# Patient Record
Sex: MALE | Race: WHITE
[De-identification: names, ages, dates, MRNs, and addresses within clinical notes are randomized per-mention and may not be internally consistent; named-entity substitution may affect disease eponyms.]

---

## 2017-07-15 ENCOUNTER — HOSPITAL ENCOUNTER (INPATIENT)
Dept: HOSPITAL 26 - MED | Age: 72
LOS: 2 days | Discharge: NURSING, REHAB OR LONG-TERM CARE | DRG: 393 | End: 2017-07-17
Payer: COMMERCIAL

## 2017-07-15 VITALS — SYSTOLIC BLOOD PRESSURE: 127 MMHG | DIASTOLIC BLOOD PRESSURE: 74 MMHG

## 2017-07-15 VITALS — SYSTOLIC BLOOD PRESSURE: 110 MMHG | DIASTOLIC BLOOD PRESSURE: 70 MMHG

## 2017-07-15 VITALS — DIASTOLIC BLOOD PRESSURE: 86 MMHG | SYSTOLIC BLOOD PRESSURE: 134 MMHG

## 2017-07-15 VITALS — WEIGHT: 134 LBS | HEIGHT: 74 IN | BODY MASS INDEX: 17.2 KG/M2

## 2017-07-15 DIAGNOSIS — Z79.899: ICD-10-CM

## 2017-07-15 DIAGNOSIS — I10: ICD-10-CM

## 2017-07-15 DIAGNOSIS — E44.0: ICD-10-CM

## 2017-07-15 DIAGNOSIS — G30.9: ICD-10-CM

## 2017-07-15 DIAGNOSIS — J45.909: ICD-10-CM

## 2017-07-15 DIAGNOSIS — Z79.82: ICD-10-CM

## 2017-07-15 DIAGNOSIS — F02.80: ICD-10-CM
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
## 2017-07-15 DIAGNOSIS — E87.0: ICD-10-CM

## 2017-07-15 DIAGNOSIS — E87.5: ICD-10-CM

## 2017-07-15 DIAGNOSIS — I70.209: ICD-10-CM

## 2017-07-15 DIAGNOSIS — E11.65: ICD-10-CM

## 2017-07-15 DIAGNOSIS — G93.41: ICD-10-CM

## 2017-07-15 DIAGNOSIS — Z88.2: ICD-10-CM

## 2017-07-15 DIAGNOSIS — E78.2: ICD-10-CM

## 2017-07-15 DIAGNOSIS — I69.991: ICD-10-CM

## 2017-07-15 DIAGNOSIS — F20.9: ICD-10-CM

## 2017-07-15 DIAGNOSIS — K21.9: ICD-10-CM

## 2017-07-15 DIAGNOSIS — K94.23: Primary | ICD-10-CM
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
## 2017-07-15 DIAGNOSIS — E86.0: ICD-10-CM

## 2017-07-15 LAB
ALBUMIN FLD-MCNC: 3.2 G/DL (ref 3.4–5)
ALP SERPL-CCNC: 89 U/L (ref 46–116)
ALT SERPL-CCNC: 19 U/L (ref 16–63)
AMYLASE SERPL-CCNC: 76 U/L (ref 25–115)
ANION GAP SERPL CALCULATED.3IONS-SCNC: 14 MMOL/L (ref 8–16)
APTT PPP: 22.9 SECS (ref 22–35.6)
AST SERPL-CCNC: 18 U/L (ref 15–37)
BASOPHILS # BLD AUTO: 0.3 K/UL (ref 0–0.22)
BASOPHILS NFR BLD AUTO: 3.7 % (ref 0–2)
BILIRUB SERPL-MCNC: 0.3 MG/DL (ref 0–1)
BUN SERPL-MCNC: 16 MG/DL (ref 7–18)
CALCIUM SPEC-MCNC: 9.1 MG/DL (ref 8.5–10.1)
CHLORIDE SERPL-SCNC: 107 MMOL/L (ref 98–107)
CHOLEST SERPL-MCNC: 207 MG/DL (ref ?–200)
CHOLEST/HDLC SERPL: 4.8 {RATIO} (ref 1–4.5)
CO2 SERPL-SCNC: 30.8 MMOL/L (ref 21–32)
CREAT SERPL-MCNC: 0.8 MG/DL (ref 0.7–1.3)
EOSINOPHIL # BLD AUTO: 0.1 K/UL (ref 0–0.4)
EOSINOPHIL NFR BLD AUTO: 1.2 % (ref 0–4)
ERYTHROCYTE [DISTWIDTH] IN BLOOD BY AUTOMATED COUNT: 13.9 % (ref 11.6–13.7)
GFR SERPL CREATININE-BSD FRML MDRD: 123 ML/MIN (ref 90–?)
GLUCOSE SERPL-MCNC: 92 MG/DL (ref 74–106)
HCT VFR BLD AUTO: 46 % (ref 36–52)
HDLC SERPL-MCNC: 43 MG/DL (ref 40–60)
HGB BLD-MCNC: 14.9 G/DL (ref 12–18)
INR PPP: 1 (ref 0.8–1.2)
LDLC SERPL CALC-MCNC: 122 MG/DL (ref 60–100)
LIPASE SERPL-CCNC: 155 U/L (ref 73–393)
LYMPHOCYTES # BLD AUTO: 1.9 K/UL (ref 2–11.5)
LYMPHOCYTES NFR BLD AUTO: 28 % (ref 20.5–51.1)
MAGNESIUM SERPL-MCNC: 2.1 MG/DL (ref 1.8–2.4)
MCH RBC QN AUTO: 32 PG (ref 27–31)
MCHC RBC AUTO-ENTMCNC: 32 G/DL (ref 33–37)
MCV RBC AUTO: 100 FL (ref 80–94)
MONOCYTES # BLD AUTO: 0.4 K/UL (ref 0.8–1)
MONOCYTES NFR BLD AUTO: 6.1 % (ref 1.7–9.3)
NEUTROPHILS # BLD AUTO: 4.2 K/UL (ref 1.8–7.7)
NEUTROPHILS NFR BLD AUTO: 61 % (ref 42.2–75.2)
PHOSPHATE SERPL-MCNC: 3.7 MG/DL (ref 2.5–4.9)
PLATELET # BLD AUTO: 166 K/UL (ref 140–450)
POTASSIUM SERPL-SCNC: 5.8 MMOL/L (ref 3.5–5.1)
PROT SERPL-MCNC: 7.8 G/DL (ref 6.4–8.2)
PROTHROMBIN TIME: 9.9 SECS (ref 10.8–13.4)
RBC # BLD AUTO: 4.61 MIL/UL (ref 4.2–6.1)
SODIUM SERPL-SCNC: 146 MMOL/L (ref 136–145)
T4 FREE SERPL-MCNC: 0.98 NG/DL (ref 0.76–1.46)
TRIGL SERPL-MCNC: 211 MG/DL (ref 30–150)
TSH SERPL DL<=0.05 MIU/L-ACNC: 2.23 UIU/ML (ref 0.34–3.74)
WBC # BLD AUTO: 6.9 K/UL (ref 4.8–10.8)

## 2017-07-15 PROCEDURE — C9113 INJ PANTOPRAZOLE SODIUM, VIA: HCPCS

## 2017-07-15 PROCEDURE — 0D20XUZ CHANGE FEEDING DEVICE IN UPPER INTESTINAL TRACT, EXTERNAL APPROACH: ICD-10-PCS

## 2017-07-15 PROCEDURE — C1758 CATHETER, URETERAL: HCPCS

## 2017-07-15 RX ADMIN — SODIUM CHLORIDE SCH MLS/HR: 9 INJECTION, SOLUTION INTRAVENOUS at 13:55

## 2017-07-15 RX ADMIN — SODIUM CHLORIDE SCH MLS/HR: 9 INJECTION, SOLUTION INTRAVENOUS at 21:00

## 2017-07-15 RX ADMIN — SODIUM CHLORIDE SCH MLS/HR: 9 INJECTION, SOLUTION INTRAVENOUS at 17:47

## 2017-07-15 RX ADMIN — CARBIDOPA AND LEVODOPA SCH TAB: 25; 100 TABLET ORAL at 17:00

## 2017-07-15 RX ADMIN — SODIUM CHLORIDE SCH MLS/HR: 9 INJECTION, SOLUTION INTRAVENOUS at 23:55

## 2017-07-15 RX ADMIN — Medication SCH DEV: at 20:29

## 2017-07-16 VITALS — DIASTOLIC BLOOD PRESSURE: 81 MMHG | SYSTOLIC BLOOD PRESSURE: 146 MMHG

## 2017-07-16 VITALS — SYSTOLIC BLOOD PRESSURE: 138 MMHG | DIASTOLIC BLOOD PRESSURE: 75 MMHG

## 2017-07-16 VITALS — DIASTOLIC BLOOD PRESSURE: 80 MMHG | SYSTOLIC BLOOD PRESSURE: 136 MMHG

## 2017-07-16 VITALS — DIASTOLIC BLOOD PRESSURE: 70 MMHG | SYSTOLIC BLOOD PRESSURE: 125 MMHG

## 2017-07-16 VITALS — DIASTOLIC BLOOD PRESSURE: 58 MMHG | SYSTOLIC BLOOD PRESSURE: 126 MMHG

## 2017-07-16 VITALS — DIASTOLIC BLOOD PRESSURE: 73 MMHG | SYSTOLIC BLOOD PRESSURE: 113 MMHG

## 2017-07-16 LAB
ANION GAP SERPL CALCULATED.3IONS-SCNC: 11.8 MMOL/L (ref 8–16)
BUN SERPL-MCNC: 10 MG/DL (ref 7–18)
CALCIUM SPEC-MCNC: 8.5 MG/DL (ref 8.5–10.1)
CHLORIDE SERPL-SCNC: 114 MMOL/L (ref 98–107)
CO2 SERPL-SCNC: 27.6 MMOL/L (ref 21–32)
CREAT SERPL-MCNC: 0.7 MG/DL (ref 0.7–1.3)
GFR SERPL CREATININE-BSD FRML MDRD: 144 ML/MIN (ref 90–?)
GLUCOSE SERPL-MCNC: 89 MG/DL (ref 74–106)
POTASSIUM SERPL-SCNC: 5.4 MMOL/L (ref 3.5–5.1)
SODIUM SERPL-SCNC: 148 MMOL/L (ref 136–145)

## 2017-07-16 RX ADMIN — SODIUM CHLORIDE SCH MLS/HR: 9 INJECTION, SOLUTION INTRAVENOUS at 08:14

## 2017-07-16 RX ADMIN — SODIUM CHLORIDE SCH MLS/HR: 9 INJECTION, SOLUTION INTRAVENOUS at 20:57

## 2017-07-16 RX ADMIN — CARBIDOPA AND LEVODOPA SCH TAB: 25; 100 TABLET ORAL at 11:57

## 2017-07-16 RX ADMIN — PANTOPRAZOLE SODIUM SCH MG: 40 INJECTION, POWDER, FOR SOLUTION INTRAVENOUS at 08:13

## 2017-07-16 RX ADMIN — DOCUSATE SODIUM SCH MG: 50 LIQUID ORAL at 08:23

## 2017-07-16 RX ADMIN — Medication SCH DEV: at 11:57

## 2017-07-16 RX ADMIN — CARBIDOPA AND LEVODOPA SCH TAB: 25; 100 TABLET ORAL at 18:03

## 2017-07-16 RX ADMIN — Medication SCH DEV: at 17:11

## 2017-07-16 RX ADMIN — SODIUM CHLORIDE SCH MLS/HR: 9 INJECTION, SOLUTION INTRAVENOUS at 18:13

## 2017-07-16 RX ADMIN — Medication SCH DEV: at 08:22

## 2017-07-16 RX ADMIN — SODIUM CHLORIDE SCH MLS/HR: 9 INJECTION, SOLUTION INTRAVENOUS at 10:34

## 2017-07-16 RX ADMIN — Medication SCH DEV: at 20:34

## 2017-07-16 RX ADMIN — Medication SCH MG: at 08:25

## 2017-07-16 RX ADMIN — CARBIDOPA AND LEVODOPA SCH TAB: 25; 100 TABLET ORAL at 08:00

## 2017-07-17 VITALS — DIASTOLIC BLOOD PRESSURE: 69 MMHG | SYSTOLIC BLOOD PRESSURE: 107 MMHG

## 2017-07-17 VITALS — DIASTOLIC BLOOD PRESSURE: 70 MMHG | SYSTOLIC BLOOD PRESSURE: 125 MMHG

## 2017-07-17 VITALS — SYSTOLIC BLOOD PRESSURE: 119 MMHG | DIASTOLIC BLOOD PRESSURE: 89 MMHG

## 2017-07-17 VITALS — DIASTOLIC BLOOD PRESSURE: 85 MMHG | SYSTOLIC BLOOD PRESSURE: 138 MMHG

## 2017-07-17 LAB
ANION GAP SERPL CALCULATED.3IONS-SCNC: 11.7 MMOL/L (ref 8–16)
BUN SERPL-MCNC: 8 MG/DL (ref 7–18)
CALCIUM SPEC-MCNC: 7.7 MG/DL (ref 8.5–10.1)
CHLORIDE SERPL-SCNC: 108 MMOL/L (ref 98–107)
CO2 SERPL-SCNC: 26 MMOL/L (ref 21–32)
CREAT SERPL-MCNC: 0.6 MG/DL (ref 0.7–1.3)
GFR SERPL CREATININE-BSD FRML MDRD: 172 ML/MIN (ref 90–?)
GLUCOSE SERPL-MCNC: 105 MG/DL (ref 74–106)
HBA1C MFR BLD: 5.3 % (ref 4.8–5.6)
POTASSIUM SERPL-SCNC: 3.7 MMOL/L (ref 3.5–5.1)
SODIUM SERPL-SCNC: 142 MMOL/L (ref 136–145)
T3RU NFR SERPL: 23 % (ref 24–39)
T4 SERPL-MCNC: 8.9 UG/DL (ref 4.5–12)

## 2017-07-17 RX ADMIN — PANTOPRAZOLE SODIUM SCH MG: 40 INJECTION, POWDER, FOR SOLUTION INTRAVENOUS at 09:10

## 2017-07-17 RX ADMIN — DOCUSATE SODIUM SCH MG: 50 LIQUID ORAL at 09:09

## 2017-07-17 RX ADMIN — CARBIDOPA AND LEVODOPA SCH TAB: 25; 100 TABLET ORAL at 12:28

## 2017-07-17 RX ADMIN — SODIUM CHLORIDE SCH MLS/HR: 9 INJECTION, SOLUTION INTRAVENOUS at 04:59

## 2017-07-17 RX ADMIN — Medication SCH MG: at 09:09

## 2017-07-17 RX ADMIN — Medication SCH DEV: at 12:22

## 2017-07-17 RX ADMIN — CARBIDOPA AND LEVODOPA SCH TAB: 25; 100 TABLET ORAL at 09:09

## 2017-07-17 RX ADMIN — SODIUM CHLORIDE SCH MLS/HR: 9 INJECTION, SOLUTION INTRAVENOUS at 09:08

## 2017-07-17 RX ADMIN — Medication SCH DEV: at 06:09

## 2017-07-22 ENCOUNTER — HOSPITAL ENCOUNTER (EMERGENCY)
Dept: HOSPITAL 26 - MED | Age: 72
Discharge: NURSING, REHAB OR LONG-TERM CARE | End: 2017-07-22
Payer: COMMERCIAL

## 2017-07-22 VITALS — DIASTOLIC BLOOD PRESSURE: 88 MMHG | SYSTOLIC BLOOD PRESSURE: 130 MMHG

## 2017-07-22 VITALS — WEIGHT: 170 LBS | HEIGHT: 74 IN | BODY MASS INDEX: 21.82 KG/M2

## 2017-07-22 DIAGNOSIS — K21.9: ICD-10-CM

## 2017-07-22 DIAGNOSIS — G30.9: ICD-10-CM

## 2017-07-22 DIAGNOSIS — F02.80: ICD-10-CM

## 2017-07-22 DIAGNOSIS — Z86.73: ICD-10-CM

## 2017-07-22 DIAGNOSIS — I10: ICD-10-CM

## 2017-07-22 DIAGNOSIS — Z79.899: ICD-10-CM

## 2017-07-22 DIAGNOSIS — Z43.1: Primary | ICD-10-CM

## 2017-07-22 PROCEDURE — 43760: CPT

## 2017-07-22 PROCEDURE — 99284 EMERGENCY DEPT VISIT MOD MDM: CPT

## 2017-07-22 PROCEDURE — 74241: CPT

## 2017-07-22 NOTE — NUR
REPORT GIVEN TO HELEN SALAZAR AT [facility]. EMR HERE TO TRANSPORT 
PATIENT BACK TO NURSING FACILITY. X-RAY G TUBE PLACEMENT CONFIRMED PER MD LAIRD. NO S/S OF ACUTE DISTRESS.

## 2017-07-22 NOTE — NUR
71/M BIBA FROM [facility] AFTER G-TUBE REMOVAL 0500 TODAY. PATIENT 
HAS HX DEMENTIA ALZHEIMERS, PARKINSONS, DM, TIA AND IS A POOR HISTORIAN. DENIES 
PAIN. NO S/S OF ACUTE DISTRESS. DENIES N/V/D; SKIN IS PINK/WARM/DRY; AAOX4 WITH 
EVEN AND STEADY GAIT; LUNGS CLEAR BL; HR EVEN AND REGULAR; PT DENIES ANY FEVER, 
CP, SOB AT THIS TIME; PATIENT STATES PAIN OF 0/10 AT THIS TIME; VSS; PATIENT 
POSITIONED FOR COMFORT; HOB ELEVATED; BEDRAILS UP X2; BED DOWN. ER MD MADE 
AWARE OF PT STATUS.

## 2017-11-19 ENCOUNTER — HOSPITAL ENCOUNTER (INPATIENT)
Dept: HOSPITAL 36 - ER | Age: 72
LOS: 1 days | Discharge: NURSING, REHAB OR LONG-TERM CARE | DRG: 394 | End: 2017-11-20
Attending: INTERNAL MEDICINE | Admitting: INTERNAL MEDICINE
Payer: MEDICARE

## 2017-11-19 DIAGNOSIS — G30.9: ICD-10-CM

## 2017-11-19 DIAGNOSIS — Z88.2: ICD-10-CM

## 2017-11-19 DIAGNOSIS — K21.9: ICD-10-CM

## 2017-11-19 DIAGNOSIS — G20: ICD-10-CM

## 2017-11-19 DIAGNOSIS — E44.1: ICD-10-CM

## 2017-11-19 DIAGNOSIS — Z66: ICD-10-CM

## 2017-11-19 DIAGNOSIS — E78.5: ICD-10-CM

## 2017-11-19 DIAGNOSIS — M35.3: ICD-10-CM

## 2017-11-19 DIAGNOSIS — F02.80: ICD-10-CM

## 2017-11-19 DIAGNOSIS — E11.9: ICD-10-CM

## 2017-11-19 DIAGNOSIS — Z86.73: ICD-10-CM
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
## 2017-11-19 DIAGNOSIS — R56.9: ICD-10-CM

## 2017-11-19 DIAGNOSIS — M81.0: ICD-10-CM

## 2017-11-19 DIAGNOSIS — M19.90: ICD-10-CM

## 2017-11-19 DIAGNOSIS — F90.9: ICD-10-CM

## 2017-11-19 DIAGNOSIS — I25.10: ICD-10-CM
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
## 2017-11-19 DIAGNOSIS — K94.23: Primary | ICD-10-CM

## 2017-11-19 LAB
ALBUMIN/GLOB SERPL: 1.1 {RATIO} (ref 1–1.8)
ALP SERPL-CCNC: 81 U/L (ref 34–104)
ALT SERPL-CCNC: 11 U/L (ref 7–52)
ANION GAP SERPL CALC-SCNC: 10.4 MMOL/L (ref 7–16)
AST SERPL-CCNC: 15 U/L (ref 13–39)
BASOPHILS NFR BLD AUTO: 0.6 % (ref 0–2)
BILIRUB SERPL-MCNC: 0.6 MG/DL (ref 0.3–1)
BUN SERPL-MCNC: 10 MG/DL (ref 7–25)
BUN/CREAT SERPL: 16.7
CALCIUM SERPL-MCNC: 9.2 MG/DL (ref 8.6–10.3)
CHLORIDE SERPL-SCNC: 107 MEQ/L (ref 98–107)
CO2 SERPL-SCNC: 27.1 MEQ/L (ref 21–31)
CREAT SERPL-MCNC: 0.6 MG/DL (ref 0.7–1.3)
EOSINOPHIL NFR BLD AUTO: 1.5 % (ref 0–5)
ERYTHROCYTE [DISTWIDTH] IN BLOOD BY AUTOMATED COUNT: 13.5 % (ref 11.5–20)
GLOBULIN SER-MCNC: 3.5 GM/DL
GLUCOSE SERPL-MCNC: 90 MG/DL (ref 70–105)
HCT VFR BLD CALC: 43.3 % (ref 41–60)
HGB BLD-MCNC: 14.3 GM/DL (ref 12–16)
INR PPP: 0.97 (ref 0.5–1.4)
LYMPHOCYTES NFR BLD AUTO: 23.9 % (ref 20–50)
MCH RBC QN AUTO: 32 PG (ref 27–31)
MCHC RBC AUTO-ENTMCNC: 32.9 PG (ref 28–36)
MCV RBC AUTO: 97.3 FL (ref 80–99)
MONOCYTES NFR BLD AUTO: 5 % (ref 2–10)
NEUTROPHILS # BLD: 5.4 TH/CMM (ref 1.8–8)
NEUTROPHILS NFR BLD AUTO: 69 % (ref 40–80)
PLATELET # BLD: 205 TH/CMM (ref 150–400)
PMV BLD AUTO: 7.9 FL
POTASSIUM SERPL-SCNC: 4.5 MEQ/L (ref 3.5–5.1)
PROTHROMBIN TIME: 10.1 SECONDS (ref 9.5–11.5)
RBC # BLD AUTO: 4.45 MIL/CMM (ref 3.8–5.8)
SODIUM SERPL-SCNC: 140 MEQ/L (ref 136–145)
WBC # BLD AUTO: 7.7 TH/CMM (ref 4.8–10.8)

## 2017-11-19 PROCEDURE — Z7610: HCPCS

## 2017-11-19 RX ADMIN — INSULIN ASPART SCH: 100 INJECTION, SOLUTION INTRAVENOUS; SUBCUTANEOUS at 20:25

## 2017-11-19 RX ADMIN — INSULIN ASPART SCH: 100 INJECTION, SOLUTION INTRAVENOUS; SUBCUTANEOUS at 18:10

## 2017-11-19 RX ADMIN — DEXTROSE AND SODIUM CHLORIDE SCH MLS/HR: 5; .45 INJECTION, SOLUTION INTRAVENOUS at 21:22

## 2017-11-19 RX ADMIN — DOCUSATE SODIUM SCH: 50 LIQUID ORAL at 16:39

## 2017-11-19 NOTE — ED PHYSICIAN CHART
ED Chief Complaint/HPI





- Patient Information


Date Seen:: 11/19/17


Time Seen:: 08:00


Chief Complaint:: Pt is here for G-tube replacement.


History of Present Illness:: 





Brought in by ambulance from nursing facility because his G-tube was 

accidentally pulled out. A Douglass catheter was already put in place to keep 

gastrostomy stoma open. Pt appears to be comfortable without distress. Pt has h/

o dementia and is not cooperative; thus, H & P are limited. Info is primarily 

from review of limited transfer documents.


Allergies:: 


 Allergies











Allergy/AdvReac Type Severity Reaction Status Date / Time


 


Sulfa (Sulfonamide Allergy   Verified 11/19/17 08:03





Antibiotics)     











Vitals:: 





see Nurse Note.


Historian:: Patient


Family MD/PCP:: 





Dr. Go.


LMP:: 





N/A


Review:: Nurse's Note Reviewed, Transfer documents Reviewed





ED Review of Systems





- Review of Systems


General/Constitutional: Other (Pt does not cooperate for ROS.)





ED Past Medical History





- Past Medical History


Past Medical History: DM, CVA/TIA, Dyslipidemia, PUD/GERD, Dementia (with 

Alzheimer's Dz.), Other (Metaboic encephalopathy.)


Family History: Other (Pt does not cooperate to provide info on FHx.)


Social History: Care Facility, Other (Pt does not cooperate to provide info on 

SHx.)


Surgical History: PEG/GTube


Psychiatricy History: Dementia


Medication: Reviewed





Family Medical History





- Family Member


  ** Father


History Unknown: Yes


Ethnicity: Non-


Living Status: Unknown





ED Physical Exam





- Physical Examination


General/Constitutional: Awake, Well-developed, well-nourished, No distress, Non-

toxic appearing


Other Gen/Cons comments:: 





Breathes comfortably, responds to voice and tactile stimuli. Pt is 

uncooperative and at times agitated.


Head: Atraumatic


Eyes: Lids, conjuctiva normal, PERRL, EOMI


Skin: No rash, No lymphadenopathy


ENMT: External ears, nose nl, Nasal exam nl, Oropharynx nl


Neck: Nontender, Full ROM w/o pain, No JVD, No nuchal rigidity, No mass, No 

stridor


Respiratory: Nl effort/Exclusion, Clear to Auscultation, No Wheeze/Rhonchi/Rales


Cardio Vascular: RRR, No murmur, gallop, rubs


GI: No tenderness/rebounding/guarding, No organomegaly, Nondistended, No mass/

bruits


Other GI comments:: 





Abdomen is soft. Gastrostomy noticed with catheter in place in upper abdomen.


Extremities: No tenderness or effusion, No edema


Other Neuro/Psych comments:: 





Alert and responds to voice and tactile stimuli. Spontaneous movements noticed 

in all 4 extremities. Pt does not cooperate for full neurological exam.





ED Labs/Radiology/EKG Results





- Lab Results


Results: 





 Laboratory Tests











  11/19/17 11/19/17 11/19/17





  10:12 10:15 10:15


 


WBC   7.7 


 


RBC   4.45 


 


Hgb   14.3 


 


Hct   43.3 


 


MCV   97.3 


 


MCH   32.0 H 


 


MCHC Differential   32.9 


 


RDW   13.5 


 


Plt Count   205 


 


MPV   7.9 


 


Neutrophils %   69.0 


 


Lymphocytes %   23.9 


 


Monocytes %   5.0 


 


Eosinophils %   1.5 


 


Basophils %   0.6 


 


Sodium    140


 


Potassium    4.5


 


Chloride    107


 


Carbon Dioxide    27.1


 


Anion Gap    10.4


 


BUN    10


 


Creatinine    0.6 L


 


Est GFR ( Amer)    > 60.0


 


Est GFR (Non-Af Amer)    > 60.0


 


BUN/Creatinine Ratio    16.7


 


Glucose    90


 


POC Glucose  89  


 


Calcium    9.2


 


Total Bilirubin    0.6


 


AST    15


 


ALT    11


 


Alkaline Phosphatase    81


 


Total Protein    7.2


 


Albumin    3.7 L


 


Globulin    3.5


 


Albumin/Globulin Ratio    1.1














ED Septic Shock





- .


Is Septic Shock (SBP<90, OR Lactate>4 mmol\L) present?: No





ED Reassessment (Disposition)





- Reassessment


Reassessment:: 





1045 Pt has been repeatedly evaluated. Pt was agitated earlier. Pt is now calm. 

Remaining lab results just became available. Admitting physician is to be 

contacted.


1123 Case was discussed with Dr. Magaña with pertinent H & P and lab findings 

reviewed. Pt is to be admitted to [facility] for observation under his care.


Reassessment Condition:: Improved





- Diagnosis


Diagnosis:: 





G-tube dislodgement.


Diabetes mellitus. Stable.


Dementia with Alzheimer's disease by hx. Stable.





- Patient Disposition


Admitted to:: Med/Surg


Admitting Medical Physician:: Fito Magaña


Time:: 11:25


Condition at Disposition:: Stable, Improved

## 2017-11-20 PROCEDURE — 0D20XUZ CHANGE FEEDING DEVICE IN UPPER INTESTINAL TRACT, EXTERNAL APPROACH: ICD-10-PCS

## 2017-11-20 RX ADMIN — INSULIN ASPART SCH: 100 INJECTION, SOLUTION INTRAVENOUS; SUBCUTANEOUS at 22:17

## 2017-11-20 RX ADMIN — DOCUSATE SODIUM SCH: 50 LIQUID ORAL at 17:06

## 2017-11-20 RX ADMIN — INSULIN ASPART SCH: 100 INJECTION, SOLUTION INTRAVENOUS; SUBCUTANEOUS at 07:38

## 2017-11-20 RX ADMIN — DOCUSATE SODIUM SCH: 50 LIQUID ORAL at 09:28

## 2017-11-20 RX ADMIN — INSULIN ASPART SCH: 100 INJECTION, SOLUTION INTRAVENOUS; SUBCUTANEOUS at 11:29

## 2017-11-20 RX ADMIN — DEXTROSE AND SODIUM CHLORIDE SCH MLS/HR: 5; .45 INJECTION, SOLUTION INTRAVENOUS at 11:22

## 2017-11-20 RX ADMIN — INSULIN ASPART SCH: 100 INJECTION, SOLUTION INTRAVENOUS; SUBCUTANEOUS at 18:17

## 2017-11-20 NOTE — CONSULTATION
DATE OF CONSULTATION:  11/20/2017



INPATIENT GASTROINTESTINAL PROCEDURE



NAME OF PROCEDURE:  G-tube change.



REFERRING PHYSICIAN:  Dr. Magaña.



REASON FOR PROCEDURE:  Malfunctioning G-tube, dysphagia.



PREOPERATIVE DIAGNOSIS:  Malfunctioning G-tube, dysphagia.



POSTOPERATIVE DIAGNOSIS:  [id number] gastrostomy tube placed.



PROCEDURE:  The patient was placed on his back.  The old G-tube site was

identified with a Douglass tube.  The internal balloon was deflated and pulled out.

 At this point, a new 22-Khmer gastrostomy tube was lubricated at the tip and

inserted through the gastrocutaneous fistula entering into stomach lumen. 

Internal balloon was inflated with 15 mL of sterile saline.  The outer phalange

was secured in position.  Procedure was then completed.



COMPLICATIONS:  None.



FINDINGS:  New 22-Khmer gastrostomy tube placed.



RECOMMENDATION:

1.  KUB with Gastrografin confirmed placement.

2.  If it is in the stomach, may begin using it.

3.  Check residual every 6 hours and hold if greater than 100 mL.

4.  Abdominal binder to protect the G-tube.



Thank you for allowing me to participate.  Please call me if any questions.





DD: 11/20/2017 14:18

DT: 11/20/2017 21:00

JOB# [id number]  [id number]

## 2017-11-20 NOTE — CONSULTATION
DATE OF CONSULTATION:  11/20/2017



INPATIENT GASTROINTESTINAL CONSULTATION



REFERRING PHYSICIAN:  Dr. Magaña.



REASON FOR CONSULTATION:  Malfunctioning G-tube and dysphagia.



HISTORY:  This is a 72-year-old male who was brought to the hospital because the

G-tube was malfunctioning.  The patient apparently had pulled it out and then

replaced it with a Douglass tube.  The patient is otherwise a poor historian.



PAST MEDICAL HISTORY:  Parkinson's, dysphagia, polymyalgia rheumatica,

encephalopathy, diabetes, dementia, TIA, stroke, Alzheimer's, osteoporosis,

hyperlipidemia, GERD, psychosis, osteoarthritis.



PAST SURGICAL HISTORY:  PEG tube.



FAMILY HISTORY:  Noncontributory.



SOCIAL HISTORY:  Resident of skilled facility.



ALLERGIES:  SULFA.



CURRENT MEDICATIONS:  Tylenol, Maalox, aspirin, Sinemet, Colace, Robitussin,

heparin, insulin, milk of magnesia, Zofran, Protonix, Depakote.



REVIEW OF SYSTEMS:  Unobtainable.



PHYSICAL EXAMINATION:

VITAL SIGNS:  Temperature 97.1, breathing 18, pulse 87, blood pressure 122/69,

satting 98%.

GENERAL:  In no apparent distress.

EYES:  Anicteric.  Normal conjunctivae.

HEENT:  Normocephalic, atraumatic.  Moist mucous membranes.

NECK:  Soft, supple.

CHEST:  Clear.  No effort.

CARDIOVASCULAR:  Regular rate and rhythm.

ABDOMEN:  Soft, nontender, nondistended with G-tube.

SKIN:  Warm, dry.

EXTREMITIES:  Reveal no cyanosis.

PSYCHOLOGICAL:  Is awake.



LABORATORY DATA:  Show normal LFTs.  White count 7.7, hemoglobin 14.3, platelets

of 205.  INR 0.97.



IMPRESSION:  This is a 72-year-old male with underlying history of dysphagia,

has malfunctioning G-tube, will need to have a replacement.  G-tube will be

changed at bedside.  Continue supportive care.



Thank you for allowing me to participate.  Please call me if any questions.





DD: 11/20/2017 13:37

DT: 11/20/2017 20:10

JOB# [id number]  [id number]

## 2017-11-20 NOTE — DIAGNOSTIC IMAGING REPORT
Upper GI (Limited)



HISTORY: Gastrostomy tube placement



Water-soluble contrast was instilled through patient's gastrostomy tube.



The exam demonstrates opacification of the gastric lumen.



IMPRESSION:

1. Confirmation of gastrostomy tube within the gastric lumen

## 2017-11-20 NOTE — HISTORY & PHYSICAL
ADMIT DATE:  11/19/2017



DICTATED FOR:  Dr. Fito Magaña



CHIEF COMPLAINT:  G-tube malfunction.



HISTORY OF PRESENT ILLNESS:  This is a 72-year-old male who is a resident of

[facility], who was brought here to [facility]

due to G-tube malfunction.  According to nursing staff at [facility], the

patient apparently pulled out his G-tube accidentally, they replaced the

patient's G-tube with a Douglass catheter to have a stoma open.  Due to the

following reasons, the patient is now admitted to the med/surg unit.



PAST MEDICAL HISTORY:  Parkinson's, dysphagia, convulsions, polymyalgia

rheumatica, metabolic encephalopathy, type 2 diabetes, dementia, history of TIA

and cerebral infarction, Alzheimer's, osteoporosis, hyperlipidemia, GERD, AHD,

psychosis, and osteoarthritis.



PAST SURGICAL HISTORY:  PEG.



FAMILY HISTORY:  Noncontributory.



SOCIAL HISTORY:  The patient is a nursing home resident, requiring 24-hour

nursing care.



MEDICATIONS:  Aspirin, Depakene, docusate, multivitamins, pimavanserin tartrate,

Protonix, Sinemet.



REVIEW OF SYSTEMS:  Unable to obtain due to the patient's mental status.  The

patient is confused.



PHYSICAL EXAMINATION:

GENERAL:  This is an elderly male, awake, confused, in no apparent distress.

VITAL SIGNS:  Temperature 98.2, heart rate 78, blood pressure 150/67,

respirations 18, O2 98%.

HEENT:  Head; normocephalic, atraumatic.

NECK:  Supple.  No mass.

LUNGS:  Clear bilaterally.

HEART:  Regular rhythm.

ABDOMEN:  Soft, nontender.

SKIN:  Noted with few excoriations.



LABORATORY RESULTS:  WBC 7.7, H and H 14.3 and 43.3, platelet of 205.



Sodium 140, potassium 4.5, chloride 107, BUN 10, creatinine 0.6.



Albumin 3.7.



ASSESSMENT:

1.  G-tube malfunction.

2.  Agitation.

3.  Mild protein calorie malnutrition.

4.  Parkinson's.

5.  Dysphagia.

6.  Type 2 diabetes.

7.  Alzheimer's.

8.  Osteoporosis.

9.  Hyperlipidemia.

10.  GERD.

11.  Atherosclerotic heart disease.

12.  Convulsions.



PLAN:  The patient to be admitted to the med/surg unit.  We will get GI consult.

 We will keep the patient n.p.o. for now.  We will keep the patient hydrated

with IV fluids.  We will monitor the patient's electrolyte levels, get a

psychiatric on the case, 1:1 sitter for safety.  Seizure precautions will be

initiated.  We will continue to monitor this patient.





DD: 11/20/2017 09:32

DT: 11/20/2017 10:07

JOB# [id number]  [id number]

## 2017-11-20 NOTE — INTERNAL MEDICINE PROG NOTE
Internal Medicine Subjective





- Subjective


Service Date: 11/20/17 ([id number] [facility] )





Internal Medicine Objective





- Results


Result Diagrams: 


 11/19/17 10:15





 11/19/17 10:15


Recent Labs: 


 Laboratory Last Values











WBC  7.7 Th/cmm (4.8-10.8)   11/19/17  10:15    


 


RBC  4.45 Mil/cmm (3.80-5.80)   11/19/17  10:15    


 


Hgb  14.3 gm/dL (12-16)   11/19/17  10:15    


 


Hct  43.3 % (41.0-60)   11/19/17  10:15    


 


MCV  97.3 fl (80-99)   11/19/17  10:15    


 


MCH  32.0 pg (27.0-31.0)  H  11/19/17  10:15    


 


MCHC Differential  32.9 pg (28.0-36.0)   11/19/17  10:15    


 


RDW  13.5 % (11.5-20.0)   11/19/17  10:15    


 


Plt Count  205 Th/cmm (150-400)   11/19/17  10:15    


 


MPV  7.9 fl  11/19/17  10:15    


 


Neutrophils %  69.0 % (40.0-80.0)   11/19/17  10:15    


 


Lymphocytes %  23.9 % (20.0-50.0)   11/19/17  10:15    


 


Monocytes %  5.0 % (2.0-10.0)   11/19/17  10:15    


 


Eosinophils %  1.5 % (0.0-5.0)   11/19/17  10:15    


 


Basophils %  0.6 % (0.0-2.0)   11/19/17  10:15    


 


PT  10.1 SECONDS (9.5-11.5)   11/19/17  10:15    


 


INR  0.97  (0.5-1.4)   11/19/17  10:15    


 


PTT (Actin FS)  25.5 SECONDS (26.0-38.0)  L  11/19/17  10:15    


 


Sodium  140 mEq/L (136-145)   11/19/17  10:15    


 


Potassium  4.5 mEq/L (3.5-5.1)   11/19/17  10:15    


 


Chloride  107 mEq/L ()   11/19/17  10:15    


 


Carbon Dioxide  27.1 mEq/L (21.0-31.0)   11/19/17  10:15    


 


Anion Gap  10.4  (7.0-16.0)   11/19/17  10:15    


 


BUN  10 mg/dL (7-25)   11/19/17  10:15    


 


Creatinine  0.6 mg/dL (0.7-1.3)  L  11/19/17  10:15    


 


Est GFR ( Amer)  > 60.0 ml/min (>90)   11/19/17  10:15    


 


Est GFR (Non-Af Amer)  > 60.0 ml/min  11/19/17  10:15    


 


BUN/Creatinine Ratio  16.7   11/19/17  10:15    


 


Glucose  90 mg/dL ()   11/19/17  10:15    


 


POC Glucose  87 MG/DL (70 - 105)   11/20/17  06:49    


 


Calcium  9.2 mg/dL (8.6-10.3)   11/19/17  10:15    


 


Total Bilirubin  0.6 mg/dL (0.3-1.0)   11/19/17  10:15    


 


AST  15 U/L (13-39)   11/19/17  10:15    


 


ALT  11 U/L (7-52)   11/19/17  10:15    


 


Alkaline Phosphatase  81 U/L ()   11/19/17  10:15    


 


Total Protein  7.2 gm/dL (6.0-8.3)   11/19/17  10:15    


 


Albumin  3.7 gm/dL (4.2-5.5)  L  11/19/17  10:15    


 


Globulin  3.5 gm/dL  11/19/17  10:15    


 


Albumin/Globulin Ratio  1.1  (1.0-1.8)   11/19/17  10:15    














- Physical Exam


Vitals and I&O: 


 Vital Signs











Temp  98.2 F   11/20/17 04:00


 


Pulse  90   11/20/17 08:06


 


Resp  14   11/20/17 08:06


 


BP  115/67   11/20/17 04:00


 


Pulse Ox  100   11/20/17 08:06








 Intake & Output











 11/19/17 11/20/17 11/20/17





 18:59 06:59 18:59


 


Intake Total  0 0


 


Balance  0 0


 


Weight (lbs)  122 lb 122 lb


 


Intake:   


 


  Oral  0 0


 


Other:   


 


  # Voids  3 2


 


  # Bowel Movements  0 0











Active Medications: 


Current Medications





Acetaminophen (Tylenol 650mg/20.3ml Suspension)  650 mg GT Q4HR PRN


   PRN Reason: Pain (Mild)


   Stop: 01/18/18 12:21


Al Hydrox/Mg Hydrox/Simethicone (Maalox)  30 ml PO Q6H PRN


   PRN Reason: Dyspepsia


   Stop: 01/18/18 12:12


Albuterol Sulfate (Albuterol 2.5mg/3ml Neb Ud)  2.5 mg HHN Q2HRT PRN


   PRN Reason: Shortness of Breath or Wheeze


   Stop: 01/18/18 12:12


Aspirin (Aspirin Chewable)  81 mg GT DAILY [facility]


   Stop: 01/19/18 08:59


   Last Admin: 11/20/17 09:28 Dose:  Not Given


Carbidopa/Levodopa (Sinemet 25mg-100 Mg)  1 tab PO TID [facility]


   Stop: 01/18/18 13:59


   Last Admin: 11/20/17 09:28 Dose:  Not Given


Docusate Sodium (Colace)  100 mg GT BID [facility]


   Stop: 01/18/18 16:59


   Last Admin: 11/20/17 09:28 Dose:  Not Given


Guaifenesin (Robitussin)  200 mg PO Q4HR PRN


   PRN Reason: Cough or Congestion


   Stop: 01/18/18 12:12


Heparin Sodium (Porcine) (Heparin)  5,000 units SUBQ Q12HR [facility]


   Stop: 01/18/18 20:59


   Last Admin: 11/19/17 21:23 Dose:  5,000 units


Dextrose/Sodium Chloride (D5-0.45ns)  1,000 mls @ 80 mls/hr IV .[id number] [facility]


   Stop: 01/18/18 12:14


   Last Admin: 11/19/17 21:22 Dose:  80 mls/hr


Insulin Aspart (Novolog)  0 units SUBQ ACHS RUY


   PRN Reason: Protocol


   Stop: 01/18/18 16:29


   Last Admin: 11/20/17 07:38 Dose:  Not Given


Ipratropium Bromide (Atrovent Neb 0.5mg/2.5ml)  0.5 mg IH Q2HRT PRN


   PRN Reason: Shortness of Breath or Wheeze


   Stop: 01/18/18 12:12


Magnesium Hydroxide (Milk Of Magnesia)  30 ml GT HS PRN


   PRN Reason: Constipation


   Stop: 01/18/18 12:10


Miscellaneous (Pimavanserin Tartrate [Nuplazid])  34 mg GT DAILY [facility]


   Stop: 01/19/18 08:59


Miscellaneous (Probiotic Screen)  1 ea MC PRN PRN


   PRN Reason: PROTOCOL


   Stop: 01/18/18 14:39


Ondansetron HCl (Zofran)  4 mg IV Q8H PRN


   PRN Reason: Nausea / Vomiting


   Stop: 01/18/18 12:12


Pantoprazole Sodium (Protonix)  40 mg PO DAILY [facility]


   Stop: 01/19/18 08:59


   Last Admin: 11/20/17 09:28 Dose:  Not Given


Valproate Sodium (Depakene)  500 mg GT BID [facility]


   Stop: 01/18/18 16:59


   Last Admin: 11/20/17 09:28 Dose:  Not Given











- Procedures


Procedures: 


 Procedures











Procedure Code Date


 


GROUP PSYCHOTHERAPY 15328 02/16/16


 


GROUP PSYCHOTHERAPY GZHZZZZ 02/16/16


 


OTHER GROUP THERAPY 94.44 04/04/13


 


RECREATIONAL THERAPY 93.81 07/30/12














Internal Medicine Assmt/Plan





- Assessment


Assessment: 





GT MALFUNCTION


AGITATION


MILD PROTEIN CALORIE MALNUTRITION


DM-2


PARKINSON'S


DYSPHAGIA


DEMENTIA


HYPERLIPIDEMIA


GERD


OA


AHD


DNR STATUS

## 2017-11-27 NOTE — HISTORY & PHYSICAL
ADMIT DATE:  11/19/2017



CHIEF COMPLAINT:  Increasing agitation, has now pulled out the G-tube, unable to

take medication.



HISTORY OF PRESENT ILLNESS:  This is a 72-year-old  male with history

of seizure, stroke, diabetes, hypercholesterolemia, Parkinson's, Alzheimer's,

admitted from nursing facility secondary to agitation, apparently managed to

pull out the G-tube, unable to put in, in the nursing facility as well as in the

ER.  The patient admitted for further management.



PAST MEDICAL HISTORY:  As mentioned in history present of illness.



PAST SURGICAL HISTORY:  Status post G-tube.



ALLERGIES:  AS PER CHART SULFA MEDICATIONS.



MEDICATIONS:  The patient is on Tylenol, aspirin, Sinemet, Colace, magnesium,

multivitamins, pantoprazole, ____, Depakote.



FAMILY HISTORY:  Noncontributory.



SOCIAL HISTORY:  The patient is a nursing home patient, requiring 24-hour total

care.



REVIEW OF SYSTEMS:  This is limited secondary to the patient's current mental

state.  We will try to obtain more detailed review of systems at a later date by

talking to family members.  There was a relative [phone] has the power of

.  We will also try to get information from nursing staff at [facility]

#[phone] as well as from  ____ who normally follows the patient.



PHYSICAL EXAMINATION:

VITAL SIGNS:  Blood pressure 101/60, respiration 14, pulse ____, temperature

____.

GENERAL:  Elderly male, appears stated age, chronically ill.

NECK:  Supple.  No mass.

LUNGS:  Equal breath sounds, few rhonchi.

HEART:  Regular rate and rhythm.  Systolic ejection murmur.

ABDOMEN:  Soft, globular with G-tube, Douglass in place.

EXTREMITIES:  Positive excoriation, atrophic contractures.

NEUROLOGIC:  Limited.



LABORATORY DATA:  WBC 7.7, hemoglobin 14, platelets 205.  Sodium 140, potassium

4.5, BUN 10, creatinine 0.6, albumin 2.7.



ASSESSMENT AND PLAN:  Agitation, dehydration ____, inability to take medication,

malfunctioning G-tube, seizure, stroke, diabetes, Alzheimer's dementia,

hypercholesterolemia, Parkinson's, ____ mild protein-calorie malnutrition.  We

will continue the patient on IV hydration.  We will correct electrolyte

abnormalities.  Continue insulin sliding scale.  We will provide the patient

with anxiolytic and refer the patient to GI and Psychiatry.  We will follow the

patient closely.





DD: 11/19/2017 12:19

DT: 11/19/2017 20:19

JOB# [id number]  [id number]

## 2018-05-11 ENCOUNTER — HOSPITAL ENCOUNTER (INPATIENT)
Dept: HOSPITAL 36 - ER | Age: 73
LOS: 7 days | Discharge: NURSING, REHAB OR LONG-TERM CARE | DRG: 885 | End: 2018-05-18
Attending: PSYCHIATRY & NEUROLOGY | Admitting: PSYCHIATRY & NEUROLOGY
Payer: MEDICARE

## 2018-05-11 VITALS — SYSTOLIC BLOOD PRESSURE: 105 MMHG | DIASTOLIC BLOOD PRESSURE: 62 MMHG

## 2018-05-11 DIAGNOSIS — Z93.1: ICD-10-CM

## 2018-05-11 DIAGNOSIS — K21.9: ICD-10-CM

## 2018-05-11 DIAGNOSIS — Z86.73: ICD-10-CM

## 2018-05-11 DIAGNOSIS — G93.41: ICD-10-CM

## 2018-05-11 DIAGNOSIS — E78.5: ICD-10-CM

## 2018-05-11 DIAGNOSIS — M19.90: ICD-10-CM

## 2018-05-11 DIAGNOSIS — F29: Primary | ICD-10-CM

## 2018-05-11 DIAGNOSIS — R45.87: ICD-10-CM

## 2018-05-11 DIAGNOSIS — G20: ICD-10-CM

## 2018-05-11 DIAGNOSIS — Z88.2: ICD-10-CM

## 2018-05-11 DIAGNOSIS — Z87.11: ICD-10-CM

## 2018-05-11 DIAGNOSIS — E11.9: ICD-10-CM

## 2018-05-11 DIAGNOSIS — I25.10: ICD-10-CM

## 2018-05-11 DIAGNOSIS — F02.81: ICD-10-CM

## 2018-05-11 DIAGNOSIS — F60.0: ICD-10-CM

## 2018-05-11 LAB
ALBUMIN SERPL-MCNC: 3.9 GM/DL (ref 4.2–5.5)
ALBUMIN/GLOB SERPL: 1.1 {RATIO} (ref 1–1.8)
ALP SERPL-CCNC: 83 U/L (ref 34–104)
ALT SERPL-CCNC: 12 U/L (ref 7–52)
ANION GAP SERPL CALC-SCNC: 14.9 MMOL/L (ref 7–16)
AST SERPL-CCNC: 12 U/L (ref 13–39)
BASOPHILS # BLD AUTO: 0.1 TH/CUMM (ref 0–0.2)
BASOPHILS NFR BLD AUTO: 0.7 % (ref 0–2)
BILIRUB SERPL-MCNC: 0.2 MG/DL (ref 0.3–1)
BUN SERPL-MCNC: 12 MG/DL (ref 7–25)
CALCIUM SERPL-MCNC: 9.8 MG/DL (ref 8.6–10.3)
CHLORIDE SERPL-SCNC: 101 MEQ/L (ref 98–107)
CO2 SERPL-SCNC: 28.4 MEQ/L (ref 21–31)
CREAT SERPL-MCNC: 0.7 MG/DL (ref 0.7–1.3)
EOSINOPHIL # BLD AUTO: 0.2 TH/CMM (ref 0.1–0.4)
EOSINOPHIL NFR BLD AUTO: 2.3 % (ref 0–5)
ERYTHROCYTE [DISTWIDTH] IN BLOOD BY AUTOMATED COUNT: 13.2 % (ref 11.5–20)
GLOBULIN SER-MCNC: 3.7 GM/DL
GLUCOSE SERPL-MCNC: 134 MG/DL (ref 70–105)
HCT VFR BLD CALC: 44.9 % (ref 41–60)
HGB BLD-MCNC: 14.9 GM/DL (ref 12–16)
LYMPHOCYTE AB SER FC-ACNC: 2.3 TH/CMM (ref 1.5–3)
LYMPHOCYTES NFR BLD AUTO: 29.1 % (ref 20–50)
MAGNESIUM SERPL-MCNC: 2.3 MG/DL (ref 1.9–2.7)
MCH RBC QN AUTO: 32 PG (ref 27–31)
MCHC RBC AUTO-ENTMCNC: 33.3 PG (ref 28–36)
MCV RBC AUTO: 96.3 FL (ref 80–99)
MONOCYTES # BLD AUTO: 0.3 TH/CMM (ref 0.3–1)
MONOCYTES NFR BLD AUTO: 4.4 % (ref 2–10)
NEUTROPHILS # BLD: 5 TH/CMM (ref 1.8–8)
NEUTROPHILS NFR BLD AUTO: 63.5 % (ref 40–80)
PLATELET # BLD: 320 TH/CMM (ref 150–400)
PMV BLD AUTO: 7 FL
POTASSIUM SERPL-SCNC: 5.3 MEQ/L (ref 3.5–5.1)
RBC # BLD AUTO: 4.66 MIL/CMM (ref 3.8–5.8)
SODIUM SERPL-SCNC: 139 MEQ/L (ref 136–145)
WBC # BLD AUTO: 7.9 TH/CMM (ref 4.8–10.8)

## 2018-05-11 PROCEDURE — Z7610: HCPCS

## 2018-05-11 NOTE — ED PHYSICIAN CHART
ED Chief Complaint/HPI





- Patient Information


Date Seen:: 05/11/18


Time Seen:: 13:06


Chief Complaint:: Increased agitation


History of Present Illness:: 


73 yo male was brought from SNF to ER for evaluation of increased agitation and 

aggressiveness. Patient was extremely combative in the ER.


Allergies:: 


 Allergies











Allergy/AdvReac Type Severity Reaction Status Date / Time


 


Sulfa (Sulfonamide Allergy   Verified 11/19/17 08:03





Antibiotics)     











Vitals:: 


 Vital Signs - 8 hr











  05/11/18





  12:47


 


Temp 97.9 F


 


HR 70


 


RR 18


 


/70


 


O2 Sat % 100














ED Review of Systems





- Review of Systems


General/Constitutional: No fever


Skin: No bruising


Head: No headache


Eyes: No pain


ENT: No nasal drainage


Neck: No neck pain


Cardio Vascular: No chest pain


Pulmonary: No SOB


GI: No nausea, No vomiting


Musculoskeletal: No bone or joint pain


Psychiatric: Prior psych history, Depression, Other (Agitation)


Neurological: No syncope





ED Past Medical History





- Past Medical History


Past Medical History: DM, CAD, CVA/TIA, Dyslipidemia, PUD/GERD, Arthritis, 

Dementia, Other (Parkinson's disease, metabolic encephalopathy)


Social History: Non Smoker, No Alcohol, No Drug Use


Surgical History: PEG/GTube


Psychiatricy History: Other (Psychosis)





Family Medical History





- Family Member


  ** Father


History Unknown: Yes


Ethnicity: Unknown


Living Status: Unknown


Hx Family Cancer: No


Hx Family Coronary Artery Disease: No


Hx Family Congestive Heart Failure: No


Hx Family Hypertension: No


Hx Family Stroke: No


Hx Family Diabetes: No


Hx Family Seizures: No


Hx Family Dementia: No


Hx Family AIDS: No


Hx Family HIV: No


Hx Family COPD: No


Hx Family Hepatitis: No


Hx Family Psychiatric Problems: No


Hx Family Tuberculosis: No





ED Physical Exam





- Physical Examination


General/Constitutional: Awake, Alert


Head: Atraumatic


Eyes: PERRL


Skin: No ecchymosis


ENMT: Nasal exam nl


Neck: No nuchal rigidity


Respiratory: No Wheeze/Rhonchi/Rales


Cardio Vascular: RRR, No murmur, gallop, rubs, NL S1 S2


Other GI comments:: 


G-tube stoma closed and dry


Extremities: normal strength in all extremities


Neuro/Psych: No focal deficits





ED Labs/Radiology/EKG Results





- Lab Results


Results: 





 Laboratory Last Values











WBC  7.9 Th/cmm (4.8-10.8)   05/11/18  15:31    


 


RBC  4.66 Mil/cmm (3.80-5.80)   05/11/18  15:31    


 


Hgb  14.9 gm/dL (12-16)   05/11/18  15:31    


 


Hct  44.9 % (41.0-60)   05/11/18  15:31    


 


MCV  96.3 fl (80-99)   05/11/18  15:31    


 


MCH  32.0 pg (27.0-31.0)  H  05/11/18  15:31    


 


MCHC Differential  33.3 pg (28.0-36.0)   05/11/18  15:31    


 


RDW  13.2 % (11.5-20.0)   05/11/18  15:31    


 


Plt Count  320 Th/cmm (150-400)   05/11/18  15:31    


 


MPV  7.0 fl  05/11/18  15:31    


 


Neutrophils %  63.5 % (40.0-80.0)   05/11/18  15:31    


 


Lymphocytes %  29.1 % (20.0-50.0)   05/11/18  15:31    


 


Monocytes %  4.4 % (2.0-10.0)   05/11/18  15:31    


 


Eosinophils %  2.3 % (0.0-5.0)   05/11/18  15:31    


 


Basophils %  0.7 % (0.0-2.0)   05/11/18  15:31    


 


Sodium  139 mEq/L (136-145)   05/11/18  15:31    


 


Potassium  5.3 mEq/L (3.5-5.1)  H  05/11/18  15:31    


 


Chloride  101 mEq/L ()   05/11/18  15:31    


 


Carbon Dioxide  28.4 mEq/L (21.0-31.0)   05/11/18  15:31    


 


Anion Gap  14.9  (7.0-16.0)   05/11/18  15:31    


 


BUN  12 mg/dL (7-25)   05/11/18  15:31    


 


Creatinine  0.7 mg/dL (0.7-1.3)   05/11/18  15:31    


 


Est GFR ( Amer)  > 60.0 ml/min (>90)   05/11/18  15:31    


 


Est GFR (Non-Af Amer)  > 60.0 ml/min  05/11/18  15:31    


 


BUN/Creatinine Ratio  17.1   05/11/18  15:31    


 


Glucose  134 mg/dL ()  H  05/11/18  15:31    


 


Calcium  9.8 mg/dL (8.6-10.3)   05/11/18  15:31    


 


Magnesium  2.3 mg/dL (1.9-2.7)   05/11/18  15:31    


 


Total Bilirubin  0.2 mg/dL (0.3-1.0)  L  05/11/18  15:31    


 


AST  12 U/L (13-39)  L  05/11/18  15:31    


 


ALT  12 U/L (7-52)   05/11/18  15:31    


 


Alkaline Phosphatase  83 U/L ()   05/11/18  15:31    


 


Troponin I  < 0.01 ng/mL (0.01-0.05)  L  05/11/18  15:31    


 


B-Natriuretic Peptide  43.8 pg/mL (5.0-100.0)   05/11/18  15:31    


 


Total Protein  7.6 gm/dL (6.0-8.3)   05/11/18  15:31    


 


Albumin  3.9 gm/dL (4.2-5.5)  L  05/11/18  15:31    


 


Globulin  3.7 gm/dL  05/11/18  15:31    


 


Albumin/Globulin Ratio  1.1  (1.0-1.8)   05/11/18  15:31    


 


TSH  1.11 uIU/ml (0.34-5.60)   05/11/18  15:31    














- Radiology Results


Results: 


CXR: no focal consolidation





ED Assessment





- Assessment


General Assessment: 


DM II


Parkinson's disease


Metabolic encephalopathy


Psychosis


Assessment/Comments:: 


CBC, CMP, Trop I, UA


EKG (patient refused)


CXR


Haldol IM


Benadryl IM


Ativan IM


Admit to geropsych unit





ED Septic Shock





- .


Is Septic Shock (SBP<90, OR Lactate>4 mmol\L) present?: No





- <6hrs of presentation:


Vital Signs: 


 Vital Signs - 8 hr











  05/11/18





  12:47


 


Temp 97.9 F


 


HR 70


 


RR 18


 


/70


 


O2 Sat % 100














ED Reassessment (Disposition)





- Reassessment


Reassessment Condition:: Improved





- Patient Disposition


Discharge/Transfer:: [facility] w/in this hosp


Admitting Medical Physician:: Dulce Bridges


Admitting Psych Physician:: Lisa Coe





ED Discharge Plan





- Patient Disposition


Admit/Discharge/Transfer: Other Care w/in this hosp


Condition at Disposition: Stable
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
## 2018-05-12 NOTE — PSYCHOSOCIAL EVALUATION
DATE OF SERVICE:  05/12/2018



IDENTIFYING DATA:  The patient is a 72-year-old  male, resident of the

[facility].  Information obtained by directly

interviewing the patient as well as reviewing the admission papers and they are

reliable.



JUSTIFICATION FOR HOSPITALIZATION:  The patient is admitted here on a voluntary

basis in view of his agitation and disruptive behavior.



CHIEF COMPLAINT:  "I do not know why I have to be here."



HISTORY OF PRESENT ILLNESS:  This is one of multiple psychiatric

hospitalizations for this patient who is known to me from the previous

psychiatric hospitalizations and treatments.  The patient on the day of the

hospitalization is reported to have been very agitated and has been getting

easily upset.  The patient has been also reported to be sexually preoccupied and

wants a female staff to be there in his room.  The patient has been diagnosed to

have psychotic disorder, not otherwise specified and has been followed up by Dr. Nowak on an outpatient basis.  The patient at the time of the evaluation has been

getting easily irritable, angry and patient has been testing the limits.  The

patient is reported to be very disorganized and the patient could not be

contained at a lower level of care and hence the patient has been referred over

here for stabilization.  Sleep and appetite prior to the hospitalization are

reported to be poor.



PAST PSYCHIATRIC HISTORY:  Please refer to the above.



MEDICAL HISTORY:  Physical examination is requested to be done by Dr. Bridges

and is noted to be significant for Parkinson's disease and degenerative joint

disease.



PHYSICAL OR SEXUAL ABUSE HISTORY:  None.



LEGAL PROBLEMS:  None at this time.



STRENGTH AND ASSETS:  The patient is motivated.



MENTAL STATUS EXAMINATION:  The patient is a 72-year-old, looking his stated

age, thin built, superficially cooperative.  Eye contact is poor.  Mood is noted

to be irritable.  Affect is constricted.  Insight and judgment at this time are

noted to be impaired.  Impulse control is noted to be poor.  The patient is

sexually preoccupied.  The patient has been insisting on a female to be in his

room and the patient has not been displaying any judgment.  The patient is also

noted to be paranoid.  The patient could not be contained at a lower level of

care.  The patient, however, is noted to be alert and aware that he is in the

hospital.  Attention span and concentration noted to be poor.



DIAGNOSTIC IMPRESSION:

AXIS I:  Psychotic disorder, not otherwise specified.

AXIS II:  None.

AXIS III:  Parkinson's disease.



IMMEDIATE TREATMENT PLAN:  The patient is going to be continued on Depakote and

low dose of Seroquel is going to be added to ____ irritability and mood swings

and the patient is going to be followed up with the supportive therapy.



ESTIMATED LENGTH OF STAY:  5-7 days.



DISCHARGE CRITERIA:  When he no longer threat to self or others and be able to

cope up with the stress.





DD: 05/12/2018 09:37

DT: 05/12/2018 13:04

[facility]# [id number]  [id number]

## 2018-05-13 NOTE — GENERAL PROGRESS NOTE
Subjective





- Review of Systems


Service Date: 05/13/18


Subjective: 





resting comfortably in gerichair


no distress





Objective





- Results


Result Diagrams: 


 05/11/18 15:31





 05/11/18 15:31


Recent Labs: 


 Laboratory Last Values











WBC  7.9 Th/cmm (4.8-10.8)   05/11/18  15:31    


 


RBC  4.66 Mil/cmm (3.80-5.80)   05/11/18  15:31    


 


Hgb  14.9 gm/dL (12-16)   05/11/18  15:31    


 


Hct  44.9 % (41.0-60)   05/11/18  15:31    


 


MCV  96.3 fl (80-99)   05/11/18  15:31    


 


MCH  32.0 pg (27.0-31.0)  H  05/11/18  15:31    


 


MCHC Differential  33.3 pg (28.0-36.0)   05/11/18  15:31    


 


RDW  13.2 % (11.5-20.0)   05/11/18  15:31    


 


Plt Count  320 Th/cmm (150-400)   05/11/18  15:31    


 


MPV  7.0 fl  05/11/18  15:31    


 


Neutrophils %  63.5 % (40.0-80.0)   05/11/18  15:31    


 


Lymphocytes %  29.1 % (20.0-50.0)   05/11/18  15:31    


 


Monocytes %  4.4 % (2.0-10.0)   05/11/18  15:31    


 


Eosinophils %  2.3 % (0.0-5.0)   05/11/18  15:31    


 


Basophils %  0.7 % (0.0-2.0)   05/11/18  15:31    


 


Sodium  139 mEq/L (136-145)   05/11/18  15:31    


 


Potassium  5.3 mEq/L (3.5-5.1)  H  05/11/18  15:31    


 


Chloride  101 mEq/L ()   05/11/18  15:31    


 


Carbon Dioxide  28.4 mEq/L (21.0-31.0)   05/11/18  15:31    


 


Anion Gap  14.9  (7.0-16.0)   05/11/18  15:31    


 


BUN  12 mg/dL (7-25)   05/11/18  15:31    


 


Creatinine  0.7 mg/dL (0.7-1.3)   05/11/18  15:31    


 


Est GFR ( Amer)  > 60.0 ml/min (>90)   05/11/18  15:31    


 


Est GFR (Non-Af Amer)  > 60.0 ml/min  05/11/18  15:31    


 


BUN/Creatinine Ratio  17.1   05/11/18  15:31    


 


Glucose  134 mg/dL ()  H  05/11/18  15:31    


 


Calcium  9.8 mg/dL (8.6-10.3)   05/11/18  15:31    


 


Magnesium  2.3 mg/dL (1.9-2.7)   05/11/18  15:31    


 


Total Bilirubin  0.2 mg/dL (0.3-1.0)  L  05/11/18  15:31    


 


AST  12 U/L (13-39)  L  05/11/18  15:31    


 


ALT  12 U/L (7-52)   05/11/18  15:31    


 


Alkaline Phosphatase  83 U/L ()   05/11/18  15:31    


 


Troponin I  < 0.01 ng/mL (0.01-0.05)  L  05/11/18  15:31    


 


B-Natriuretic Peptide  43.8 pg/mL (5.0-100.0)   05/11/18  15:31    


 


Total Protein  7.6 gm/dL (6.0-8.3)   05/11/18  15:31    


 


Albumin  3.9 gm/dL (4.2-5.5)  L  05/11/18  15:31    


 


Globulin  3.7 gm/dL  05/11/18  15:31    


 


Albumin/Globulin Ratio  1.1  (1.0-1.8)   05/11/18  15:31    


 


TSH  1.11 uIU/ml (0.34-5.60)   05/11/18  15:31    


 


Valproic Acid  36.2 ug/mL (50.0-100.0)  L  05/12/18  10:52    














- Physical Exam


Vitals and I&O: 


 Vital Signs











Temp  99.2 F   05/13/18 15:46


 


Pulse  76   05/13/18 15:46


 


Resp  20   05/13/18 15:46


 


BP  114/67   05/13/18 15:46


 


Pulse Ox  96   05/13/18 15:46











Active Medications: 


Current Medications





Acetaminophen (Tylenol)  650 mg PO Q4HR PRN


   PRN Reason: Mild Pain / Temp above 100


   Stop: 07/10/18 19:50


   Last Admin: 05/13/18 09:57 Dose:  650 mg


Carbidopa/Levodopa (Sinemet 25mg-100 Mg)  2 tab PO TID [facility]Y


   Stop: 07/10/18 20:59


   Last Admin: 05/13/18 16:59 Dose:  Not Given


Docusate Sodium (Colace)  100 mg PO BID [facility]


   Stop: 07/11/18 08:59


   Last Admin: 05/13/18 16:59 Dose:  Not Given


Lorazepam (Ativan)  0.5 mg PO Q4HR PRN; Protocol


   PRN Reason: Anxiety


   Stop: 06/10/18 21:55


   Last Admin: 05/13/18 09:57 Dose:  0.5 mg


Magnesium Hydroxide (Milk Of Magnesia)  30 ml PO HS PRN


   PRN Reason: Constipation


   Stop: 07/10/18 19:50


Multivitamins/Vitamin C (Theragran)  1 tab PO DAILY RUY


   Stop: 07/11/18 08:59


   Last Admin: 05/13/18 09:32 Dose:  1 tab


Quetiapine Fumarate (Seroquel)  25 mg PO HS RUY


   PRN Reason: Protocol


   Stop: 07/12/18 13:32


Valproate Sodium (Depakene)  500 mg PO BID [facility]


   Stop: 07/11/18 08:59


   Last Admin: 05/13/18 16:59 Dose:  500 mg


Zolpidem Tartrate (Ambien)  5 mg PO HS PRN


   PRN Reason: Insomnia


   Stop: 07/10/18 19:50








General: No acute distress


HEENT: PERRLA, EOMI


Neck: Supple, JVD, Thyromegaly


Cardiovascular: Regular rate, Normal S1, Normal S2


Lungs: Clear to auscultation


Abdomen: Bowel sounds, Soft





- Procedures


Procedures: 


 Procedures











Procedure Code Date


 


CHANGE FEEDING DEVICE IN UP INTEST TRACT, EXTERN APPROACH [id number] 11/19/17


 


GROUP PSYCHOTHERAPY 18607 02/16/16


 


GROUP PSYCHOTHERAPY [id number] 02/16/16


 


OTHER GROUP THERAPY 94.44 04/04/13


 


RECREATIONAL THERAPY 93.81 07/30/12














Assessment/Plan





- Assessment


Assessment: 





parkinson's dz


dementia


DJD





- Plan


Plan: 





cont current treatment

## 2018-05-13 NOTE — PROGRESS NOTES
DATE:  05/13/2018



PSYCHIATRIC PROGRESS NOTE



SUBJECTIVE:  Staff was spoken to.  The patient is interviewed.  Mood is noted to

be irritable.  Affect is constricted, even if the patient was fair.  The patient

has constantly been saying that he is hungry.  The patient's coping skills are

noted to be very poor.  The patient is currently on valproic acid 500 mg twice a

day and has been able to tolerate the medication, but has been getting easily

irritable and angry.  The patient has been placed on 12.5 mg of the Seroquel,

which is going to be gradually increased to 25 mg and patient is going to be

closely monitored and provided with supportive psychotherapy.  The patient is

not ready to be discharged to a lower level of care yet.





DD: 05/13/2018 13:33

DT: 05/13/2018 18:09

[facility]# [id number]  [id number]

## 2018-05-14 NOTE — CONSULTATION
DATE OF CONSULTATION:     05/14/2018



REFERRING PHYSICIAN:  Lisa Coe M.D.



TYPE OF CONSULTATION:  Psychology.



HISTORY OF PRESENT ILLNESS:  The patient is a 72-year-old  male.  The

patient is a resident of [facility].  The patient is known to this

writer from a previous hospitalization.  The following is my review of the 
medical

record and by the patient's self report.  The patient is admitted due to

increased agitation and disruptive behavior.  According to record review, the

patient was reported by the staff at his facility to be sexually preoccupied

with female staff.  The patient had been making gestures as well as comments to

the female staff.  Upon interview, the patient denied these behaviors and stated

that he did not know why he had to be here.  The patient presents as irritable

and agitated.  The patient denied any suicidal ideation, plan or intention.



PAST MEDICAL HISTORY:  Please see history and physical by Dr. Bridges.  The

patient has a history of Parkinson's disease and degenerative joint disease.



PAST PSYCHIATRIC HISTORY:  Not available at the time of this clinical

interview.



SUBSTANCE ABUSE HISTORY:  The patient denied any history.



PSYCHOSOCIAL HISTORY:  The patient did not answer questions about occupational

or educational history or Gnosticist affiliation.  The patient denied any history

of physical or sexual abuse or any current legal problems.



MENTAL STATUS EXAMINATION:  The patient appears to be his stated age.  The

patient's attitude is superficially cooperative.  Eye contact is poor.  Speech

is pressured and hyperverbal.  The patient denied any auditory or visual

hallucinations.  The patient denies any suicidal ideation, plan or intention. 

Thought process shows to be somewhat confused and disorganized.  The patient's

behavior on the unit has been testing the limits of the staff.  Impulse control

is inadequate.  Concentration is poor.  There seems to be some paranoid

ideation.  This needs further evaluation.  Sensorium is alert and oriented to

person and place.  The patient did not participate in the memory assessment. 

The patient did not participate in the interpretation of proverbs.  Insight is

poor.  Judgment is poor.



DIAGNOSTIC IMPRESSION:

AXIS I:  Psychotic disorder, not otherwise specified.

AXIS II:  Deferred.

AXIS III, Parkinson's disease.



PLAN:  The patient has been seen by Dr. Coe for psychiatric evaluation

and for management of the patient's psychotropic medications.  According to

record, the patient is continued on Depakote and low dose of Seroquel will be

added.  The patient continues to display an irritable mood.  We will provide

anger management as well as de-escalation.  We will provide limit setting and

motivational enhancement for the patient to become compliant and stay compliant

with all aspects of his care and treatment plan.  We will provide coping

strategies for phase of life issues.  We will provide reality testing, reality

orientation, and reality integration.  We will continue to provide the patient

an opportunity to verbalize his concerns versus acting out.  The patient will be

able to demonstrate emotional and self-regulation including no sexual gestures 
or remarks

prior to his discharge.



Thank you, Dr. Coe for this consult and the opportunity to participate

with you in this patient's care.





DD: 05/14/2018 15:47

DT: 05/14/2018 20:57

JOB# [id number]  [id number]

ANAIS

## 2018-05-14 NOTE — INTERNAL MEDICINE PROG NOTE
Internal Medicine Subjective





- Subjective


Service Date: 05/14/18


Patient is:: awake, verbal, in bed, confused


Per staff patient has:: no adverse event





Internal Medicine Objective





- Results


Result Diagrams: 


 05/11/18 15:31





 05/11/18 15:31


Recent Labs: 


 Laboratory Last Values











WBC  7.9 Th/cmm (4.8-10.8)   05/11/18  15:31    


 


RBC  4.66 Mil/cmm (3.80-5.80)   05/11/18  15:31    


 


Hgb  14.9 gm/dL (12-16)   05/11/18  15:31    


 


Hct  44.9 % (41.0-60)   05/11/18  15:31    


 


MCV  96.3 fl (80-99)   05/11/18  15:31    


 


MCH  32.0 pg (27.0-31.0)  H  05/11/18  15:31    


 


MCHC Differential  33.3 pg (28.0-36.0)   05/11/18  15:31    


 


RDW  13.2 % (11.5-20.0)   05/11/18  15:31    


 


Plt Count  320 Th/cmm (150-400)   05/11/18  15:31    


 


MPV  7.0 fl  05/11/18  15:31    


 


Neutrophils %  63.5 % (40.0-80.0)   05/11/18  15:31    


 


Lymphocytes %  29.1 % (20.0-50.0)   05/11/18  15:31    


 


Monocytes %  4.4 % (2.0-10.0)   05/11/18  15:31    


 


Eosinophils %  2.3 % (0.0-5.0)   05/11/18  15:31    


 


Basophils %  0.7 % (0.0-2.0)   05/11/18  15:31    


 


Sodium  139 mEq/L (136-145)   05/11/18  15:31    


 


Potassium  5.3 mEq/L (3.5-5.1)  H  05/11/18  15:31    


 


Chloride  101 mEq/L ()   05/11/18  15:31    


 


Carbon Dioxide  28.4 mEq/L (21.0-31.0)   05/11/18  15:31    


 


Anion Gap  14.9  (7.0-16.0)   05/11/18  15:31    


 


BUN  12 mg/dL (7-25)   05/11/18  15:31    


 


Creatinine  0.7 mg/dL (0.7-1.3)   05/11/18  15:31    


 


Est GFR ( Amer)  > 60.0 ml/min (>90)   05/11/18  15:31    


 


Est GFR (Non-Af Amer)  > 60.0 ml/min  05/11/18  15:31    


 


BUN/Creatinine Ratio  17.1   05/11/18  15:31    


 


Glucose  134 mg/dL ()  H  05/11/18  15:31    


 


Calcium  9.8 mg/dL (8.6-10.3)   05/11/18  15:31    


 


Magnesium  2.3 mg/dL (1.9-2.7)   05/11/18  15:31    


 


Total Bilirubin  0.2 mg/dL (0.3-1.0)  L  05/11/18  15:31    


 


AST  12 U/L (13-39)  L  05/11/18  15:31    


 


ALT  12 U/L (7-52)   05/11/18  15:31    


 


Alkaline Phosphatase  83 U/L ()   05/11/18  15:31    


 


Troponin I  < 0.01 ng/mL (0.01-0.05)  L  05/11/18  15:31    


 


B-Natriuretic Peptide  43.8 pg/mL (5.0-100.0)   05/11/18  15:31    


 


Total Protein  7.6 gm/dL (6.0-8.3)   05/11/18  15:31    


 


Albumin  3.9 gm/dL (4.2-5.5)  L  05/11/18  15:31    


 


Globulin  3.7 gm/dL  05/11/18  15:31    


 


Albumin/Globulin Ratio  1.1  (1.0-1.8)   05/11/18  15:31    


 


TSH  1.11 uIU/ml (0.34-5.60)   05/11/18  15:31    


 


Valproic Acid  36.2 ug/mL (50.0-100.0)  L  05/12/18  10:52    














- Physical Exam


Vitals and I&O: 


 Vital Signs











Temp  97.8 F   05/14/18 20:00


 


Pulse  73   05/14/18 20:00


 


Resp  18   05/14/18 20:00


 


BP  117/75   05/14/18 20:00


 


Pulse Ox  97   05/14/18 20:00








 Intake & Output











 05/14/18 05/14/18 05/15/18





 06:59 18:59 06:59


 


Intake Total   360


 


Balance   360


 


Intake:   


 


  Oral   360


 


Other:   


 


  # Voids   3











Active Medications: 


Current Medications





Acetaminophen (Tylenol)  650 mg PO Q4HR PRN


   PRN Reason: Mild Pain / Temp above 100


   Stop: 07/10/18 19:50


   Last Admin: 05/13/18 09:57 Dose:  650 mg


Carbidopa/Levodopa (Sinemet 25mg-100 Mg)  2 tab PO TID [facility]


   Stop: 07/10/18 20:59


   Last Admin: 05/14/18 20:33 Dose:  2 tab


Docusate Sodium (Colace)  100 mg PO BID [facility]


   Stop: 07/11/18 08:59


   Last Admin: 05/14/18 16:31 Dose:  100 mg


Lorazepam (Ativan)  0.5 mg PO Q4HR PRN; Protocol


   PRN Reason: Anxiety


   Stop: 06/10/18 21:55


   Last Admin: 05/13/18 09:57 Dose:  0.5 mg


Magnesium Hydroxide (Milk Of Magnesia)  30 ml PO HS PRN


   PRN Reason: Constipation


   Stop: 07/10/18 19:50


Multivitamins/Vitamin C (Theragran)  1 tab PO DAILY [facility]


   Stop: 07/11/18 08:59


   Last Admin: 05/14/18 09:49 Dose:  1 tab


Quetiapine Fumarate (Seroquel)  25 mg PO HS [facility]


   PRN Reason: Protocol


   Stop: 07/12/18 13:32


   Last Admin: 05/14/18 20:33 Dose:  25 mg


Valproate Sodium (Depakene)  500 mg PO BID [facility]


   Stop: 07/11/18 08:59


   Last Admin: 05/14/18 16:31 Dose:  500 mg


Zolpidem Tartrate (Ambien)  5 mg PO HS PRN


   PRN Reason: Insomnia


   Stop: 07/10/18 19:50








General: demented


HEENT: anicteric sclerae, throat clear


Neck: Supple, No JVD, No thyromegaly, +2 carotid pulse wo bruit, No LAD


Cardiovascular: RRR, Normal S1, Normal S2, without murmur


Abdomen: soft, non-tender, non-distended


Neurological: no change





- Procedures


Procedures: 


 Procedures











Procedure Code Date


 


CHANGE FEEDING DEVICE IN UP INTEST TRACT, EXTERN APPROACH [id number] 11/19/17


 


GROUP PSYCHOTHERAPY 55498 02/16/16


 


GROUP PSYCHOTHERAPY [id number] 02/16/16


 


OTHER GROUP THERAPY 94.44 04/04/13


 


RECREATIONAL THERAPY 93.81 07/30/12














Internal Medicine Assmt/Plan





- Assessment


Assessment: 





1.parkinsons disease.


2.djd.


3.dementia.


4.psychosis.





- Plan


Plan: 





continue on current medication and diet.

## 2018-05-15 NOTE — INTERNAL MEDICINE PROG NOTE
Internal Medicine Subjective





- Subjective


Service Date: 05/15/18


Patient seen and examined:: without staff


Patient is:: awake, verbal, in bed, confused


Per staff patient has:: no adverse event





Internal Medicine Objective





- Results


Result Diagrams: 


 05/11/18 15:31





 05/11/18 15:31


Recent Labs: 


 Laboratory Last Values











WBC  7.9 Th/cmm (4.8-10.8)   05/11/18  15:31    


 


RBC  4.66 Mil/cmm (3.80-5.80)   05/11/18  15:31    


 


Hgb  14.9 gm/dL (12-16)   05/11/18  15:31    


 


Hct  44.9 % (41.0-60)   05/11/18  15:31    


 


MCV  96.3 fl (80-99)   05/11/18  15:31    


 


MCH  32.0 pg (27.0-31.0)  H  05/11/18  15:31    


 


MCHC Differential  33.3 pg (28.0-36.0)   05/11/18  15:31    


 


RDW  13.2 % (11.5-20.0)   05/11/18  15:31    


 


Plt Count  320 Th/cmm (150-400)   05/11/18  15:31    


 


MPV  7.0 fl  05/11/18  15:31    


 


Neutrophils %  63.5 % (40.0-80.0)   05/11/18  15:31    


 


Lymphocytes %  29.1 % (20.0-50.0)   05/11/18  15:31    


 


Monocytes %  4.4 % (2.0-10.0)   05/11/18  15:31    


 


Eosinophils %  2.3 % (0.0-5.0)   05/11/18  15:31    


 


Basophils %  0.7 % (0.0-2.0)   05/11/18  15:31    


 


Sodium  139 mEq/L (136-145)   05/11/18  15:31    


 


Potassium  5.3 mEq/L (3.5-5.1)  H  05/11/18  15:31    


 


Chloride  101 mEq/L ()   05/11/18  15:31    


 


Carbon Dioxide  28.4 mEq/L (21.0-31.0)   05/11/18  15:31    


 


Anion Gap  14.9  (7.0-16.0)   05/11/18  15:31    


 


BUN  12 mg/dL (7-25)   05/11/18  15:31    


 


Creatinine  0.7 mg/dL (0.7-1.3)   05/11/18  15:31    


 


Est GFR ( Amer)  > 60.0 ml/min (>90)   05/11/18  15:31    


 


Est GFR (Non-Af Amer)  > 60.0 ml/min  05/11/18  15:31    


 


BUN/Creatinine Ratio  17.1   05/11/18  15:31    


 


Glucose  134 mg/dL ()  H  05/11/18  15:31    


 


Calcium  9.8 mg/dL (8.6-10.3)   05/11/18  15:31    


 


Magnesium  2.3 mg/dL (1.9-2.7)   05/11/18  15:31    


 


Total Bilirubin  0.2 mg/dL (0.3-1.0)  L  05/11/18  15:31    


 


AST  12 U/L (13-39)  L  05/11/18  15:31    


 


ALT  12 U/L (7-52)   05/11/18  15:31    


 


Alkaline Phosphatase  83 U/L ()   05/11/18  15:31    


 


Troponin I  < 0.01 ng/mL (0.01-0.05)  L  05/11/18  15:31    


 


B-Natriuretic Peptide  43.8 pg/mL (5.0-100.0)   05/11/18  15:31    


 


Total Protein  7.6 gm/dL (6.0-8.3)   05/11/18  15:31    


 


Albumin  3.9 gm/dL (4.2-5.5)  L  05/11/18  15:31    


 


Globulin  3.7 gm/dL  05/11/18  15:31    


 


Albumin/Globulin Ratio  1.1  (1.0-1.8)   05/11/18  15:31    


 


TSH  1.11 uIU/ml (0.34-5.60)   05/11/18  15:31    


 


Valproic Acid  36.2 ug/mL (50.0-100.0)  L  05/12/18  10:52    


 


RPR  NONREACTIVE  (NONREACTIVE)   05/11/18  15:31    














- Physical Exam


Vitals and I&O: 


 Vital Signs











Temp  98.1 F   05/15/18 06:12


 


Pulse  69   05/15/18 06:12


 


Resp  18   05/15/18 06:12


 


BP  110/72   05/15/18 06:12


 


Pulse Ox  98   05/15/18 06:12








 Intake & Output











 05/14/18 05/15/18 05/15/18





 18:59 06:59 18:59


 


Intake Total  360 


 


Balance  360 


 


Intake:   


 


  Oral  360 


 


Other:   


 


  # Voids  3 











Active Medications: 


Current Medications





Acetaminophen (Tylenol)  650 mg PO Q4HR PRN


   PRN Reason: Mild Pain / Temp above 100


   Stop: 07/10/18 19:50


   Last Admin: 05/13/18 09:57 Dose:  650 mg


Carbidopa/Levodopa (Sinemet 25mg-100 Mg)  2 tab PO TID [facility]


   Stop: 07/10/18 20:59


   Last Admin: 05/15/18 09:57 Dose:  2 tab


Docusate Sodium (Colace)  100 mg PO BID [facility]


   Stop: 07/11/18 08:59


   Last Admin: 05/15/18 09:57 Dose:  100 mg


Lorazepam (Ativan)  0.5 mg PO Q4HR PRN; Protocol


   PRN Reason: Anxiety


   Stop: 06/10/18 21:55


   Last Admin: 05/13/18 09:57 Dose:  0.5 mg


Magnesium Hydroxide (Milk Of Magnesia)  30 ml PO HS PRN


   PRN Reason: Constipation


   Stop: 07/10/18 19:50


Multivitamins/Vitamin C (Theragran)  1 tab PO DAILY [facility]


   Stop: 07/11/18 08:59


   Last Admin: 05/15/18 09:57 Dose:  1 tab


Quetiapine Fumarate (Seroquel)  25 mg PO HS [facility]


   PRN Reason: Protocol


   Stop: 07/12/18 13:32


   Last Admin: 05/14/18 20:33 Dose:  25 mg


Valproate Sodium (Depakene)  500 mg PO BID [facility]


   Stop: 07/11/18 08:59


   Last Admin: 05/15/18 09:56 Dose:  500 mg


Zolpidem Tartrate (Ambien)  5 mg PO HS PRN


   PRN Reason: Insomnia


   Stop: 07/10/18 19:50








General: demented


HEENT: anicteric sclerae, throat clear


Neck: Supple, No JVD, No thyromegaly, +2 carotid pulse wo bruit, No LAD


Cardiovascular: RRR, Normal S1, Normal S2, without murmur


Abdomen: soft, non-tender, non-distended


Neurological: no change





- Procedures


Procedures: 


 Procedures











Procedure Code Date


 


CHANGE FEEDING DEVICE IN UP INTEST TRACT, EXTERN APPROACH [id number] 11/19/17


 


GROUP PSYCHOTHERAPY [id number] 02/16/16


 


GROUP PSYCHOTHERAPY [id number] 02/16/16


 


OTHER GROUP THERAPY 94.44 04/04/13


 


RECREATIONAL THERAPY 93.81 07/30/12














Internal Medicine Assmt/Plan





- Assessment


Assessment: 





1.parkinsons disease.


2.djd.


3.dementia.


4.psychosis.





- Plan


Plan: 





continue on current medication and diet.

## 2018-05-15 NOTE — PROGRESS NOTES
DATE:  05/14/2018



SUBJECTIVE:  Staff was spoken to.  The patient is interviewed.  Mood is noted to

be irritable.  Affect is constricted.  Insight and judgment at this time are

noted to be still impaired.  Impulse control is noted to be limited.  The

patient needs to be redirected.  Coping skills are noted to be very poor.  The

patient has been able to tolerate the medications at this time and patient

constantly asks for food.  The patient is currently on carbidopa/levodopa and is

also on 25 mg of the Seroquel and 500 mg twice a day of the Depakote.



ASSESSMENT:  The patient is still impulsive and paranoid.



PLAN:  To continue the patient with the supportive therapy and followup.





DD: 05/14/2018 19:08

DT: 05/15/2018 10:43

[facility]# [id number]  [id number]

## 2018-05-15 NOTE — PROGRESS NOTES
DATE:  05/15/2018



SUBJECTIVE:  Staff was spoken to.  The patient is interviewed.  Mood is noted to

be irritable.  Affect is constricted.  The patient is getting easily irritable

and angry.  Coping skills are noted to be very poor.  Sleep and appetite also

noted very poor.  The patient has been having difficult time to cope with the

stress.  No side effects to the medications are noted at this time.  The patient

is currently on valproic acid 500 mg twice a day and Seroquel 25 mg at bedtime. 

The patient has been able to tolerate the medication.



ASSESSMENT:  The patient is still paranoid and impulsive.



PLAN:  To continue the patient with the supportive therapy, encouraged the

patient to verbalize the concerns rather than to act out.





DD: 05/15/2018 08:40

DT: 05/15/2018 14:30

[facility]# [id number]  [id number]

## 2018-05-16 NOTE — INTERNAL MEDICINE PROG NOTE
Internal Medicine Subjective





- Subjective


Service Date: 18


Patient seen and examined:: without staff


Patient is:: awake, verbal, in bed, confused


Per staff patient has:: no adverse event





Internal Medicine Objective





- Results


Result Diagrams: 


 18 15:31





 18 15:31


Recent Labs: 


 Laboratory Last Values











WBC  7.9 Th/cmm (4.8-10.8)   18  15:31    


 


RBC  4.66 Mil/cmm (3.80-5.80)   18  15:31    


 


Hgb  14.9 gm/dL (12-16)   18  15:31    


 


Hct  44.9 % (41.0-60)   18  15:31    


 


MCV  96.3 fl (80-99)   18  15:31    


 


MCH  32.0 pg (27.0-31.0)  H  18  15:31    


 


MCHC Differential  33.3 pg (28.0-36.0)   18  15:31    


 


RDW  13.2 % (11.5-20.0)   18  15:31    


 


Plt Count  320 Th/cmm (150-400)   18  15:31    


 


MPV  7.0 fl  18  15:31    


 


Neutrophils %  63.5 % (40.0-80.0)   18  15:31    


 


Lymphocytes %  29.1 % (20.0-50.0)   18  15:    


 


Monocytes %  4.4 % (2.0-10.0)   18  15:31    


 


Eosinophils %  2.3 % (0.0-5.0)   18  15:31    


 


Basophils %  0.7 % (0.0-2.0)   18  15:31    


 


Sodium  139 mEq/L (136-145)   18  15:31    


 


Potassium  5.3 mEq/L (3.5-5.1)  H  18  15:31    


 


Chloride  101 mEq/L ()   18  15:31    


 


Carbon Dioxide  28.4 mEq/L (21.0-31.0)   18  15:31    


 


Anion Gap  14.9  (7.0-16.0)   18  15:31    


 


BUN  12 mg/dL (7-25)   18  15:31    


 


Creatinine  0.7 mg/dL (0.7-1.3)   18  15:31    


 


Est GFR ( Amer)  > 60.0 ml/min (>90)   18  15:31    


 


Est GFR (Non-Af Amer)  > 60.0 ml/min  18  15:31    


 


BUN/Creatinine Ratio  17.1   18  15:31    


 


Glucose  134 mg/dL ()  H  18  15:31    


 


Calcium  9.8 mg/dL (8.6-10.3)   18  15:31    


 


Magnesium  2.3 mg/dL (1.9-2.7)   18  15:31    


 


Total Bilirubin  0.2 mg/dL (0.3-1.0)  L  18  15:31    


 


AST  12 U/L (13-39)  L  18  15:31    


 


ALT  12 U/L (7-52)   18  15:31    


 


Alkaline Phosphatase  83 U/L ()   18  15:31    


 


Troponin I  < 0.01 ng/mL (0.01-0.05)  L  18  15:31    


 


B-Natriuretic Peptide  43.8 pg/mL (5.0-100.0)   18  15:31    


 


Total Protein  7.6 gm/dL (6.0-8.3)   18  15:31    


 


Albumin  3.9 gm/dL (4.2-5.5)  L  18  15:31    


 


Globulin  3.7 gm/dL  18  15:31    


 


Albumin/Globulin Ratio  1.1  (1.0-1.8)   18  15:31    


 


TSH  1.11 uIU/ml (0.34-5.60)   18  15:31    


 


Valproic Acid  36.2 ug/mL (50.0-100.0)  L  18  10:52    


 


RPR  NONREACTIVE  (NONREACTIVE)   18  15:31    














- Physical Exam


Vitals and I&O: 


 Vital Signs











Temp  98.3 F   18 15:52


 


Pulse  87   18 15:52


 


Resp  20   18 15:52


 


BP  93/52   18 15:52


 


Pulse Ox  96   18 15:52








 Intake & Output











 18





 06:59 18:59 06:59


 


Intake Total 480  


 


Balance 480  


 


Intake:   


 


  Oral 480  


 


Other:   


 


  # Voids 2  











Active Medications: 


Current Medications





Acetaminophen (Tylenol)  650 mg PO Q4HR PRN


   PRN Reason: Mild Pain / Temp above 100


   Stop: 07/10/18 19:50


   Last Admin: 18 09:57 Dose:  650 mg


Carbidopa/Levodopa (Sinemet 25mg-100 Mg)  2 tab PO TID [facility]


   Stop: 07/10/18 20:59


   Last Admin: 18 20:50 Dose:  2 tab


Docusate Sodium (Colace)  100 mg PO BID [facility]


   Stop: 18 08:59


   Last Admin: 18 16:25 Dose:  100 mg


Lorazepam (Ativan)  0.5 mg PO Q4HR PRN; Protocol


   PRN Reason: Anxiety


   Stop: 06/10/18 21:55


   Last Admin: 05/15/18 21:12 Dose:  0.5 mg


Magnesium Hydroxide (Milk Of Magnesia)  30 ml PO HS PRN


   PRN Reason: Constipation


   Stop: 07/10/18 19:50


Multivitamins/Vitamin C (Theragran)  1 tab PO DAILY [facility]


   Stop: 18 08:59


   Last Admin: 18 08:06 Dose:  1 tab


Quetiapine Fumarate (Seroquel)  25 mg PO HS [facility]


   PRN Reason: Protocol


   Stop: 18 13:32


   Last Admin: 18 20:50 Dose:  25 mg


Valproate Sodium (Depakene)  500 mg PO BID [facility]


   Stop: 18 08:59


   Last Admin: 18 16:25 Dose:  500 mg


Zolpidem Tartrate (Ambien)  5 mg PO HS PRN


   PRN Reason: Insomnia


   Stop: 07/10/18 19:50


   Last Admin: 18 20:50 Dose:  5 mg








General: demented


HEENT: anicteric sclerae, throat clear


Neck: Supple, No JVD, No thyromegaly, +2 carotid pulse wo bruit, No LAD


Cardiovascular: RRR, Normal S1, Normal S2, without murmur


Abdomen: soft, non-tender, non-distended


Neurological: no change





- Procedures


Procedures: 


 Procedures











Procedure Code Date


 


CHANGE FEEDING DEVICE IN UP INTEST TRACT, EXTERN APPROACH [id number]


 


GROUP PSYCHOTHERAPY [id number] 16


 


GROUP PSYCHOTHERAPY [id number] 16


 


OTHER GROUP THERAPY 94.44 13


 


RECREATIONAL THERAPY 93.81 12














Internal Medicine Assmt/Plan





- Assessment


Assessment: 





1.parkinsons disease.


2.djd.


3.dementia.


4.psychosis.





- Plan


Plan: 





continue on current medication and diet.





Nutritional Asmnt/Malnutr-PDOC





- Dietary Evaluation


Malnutrition Findings (Please click <Entered> for more info): 








Nutritional Asmnt/Malnutrition                             Start:  18 15:

02


Text:                                                      Status: Complete    

  


Freq:                                                                          

  


 Document     18 15:04  LYLY  (Rec: 18 15:10  LYLY  [id number])


 Nutritional Asmnt/Malnutrition


     Patient General Information


      Nutritional Screening                      Moderate Risk


      Diagnosis                                  psychosis NOS


      Pertinent Medical Hx/Surgical Hx           dementia, psychosis, parkinson


                                                 's disease, DJD


      Subjective Information                     pt seen sitting in jeremias-chair


                                                 in hallway, very confused, not


                                                 able to communicate. Per


                                                 nurse, pt eats well.


      Current Diet Order/ Nutrition Support      pureed


      Pertinent Medications                      colace, theragran, serqoquel


      Pertinent Labs                              K 5.2, glucose 134


     Nutritional Hx/Data


      Height                                     1.75 m


      Height (Calculated Centimeters)            175.3


      Current Weight (lbs)                       60.328 kg


      Weight (Calculated Kilograms)              60.3


      Weight (Calculated Grams)                  62365.8


      Ideal Body Weight                          160


      % Ideal Body Weight                        83


      Body Mass Index (BMI)                      19.6


      Weight Status                              Approriate


     GI Symptoms


      GI Symptoms                                None


      Last BM                                    5/15


      Difficult in:                              None


      Skin Integrity/Comment:                    ald gtube stoma closed, skin


                                                 intact


      Current %PO                                Good (%)


     Estimated Nutritional Goals


      BEE in Kcals:                              Using Current wt


      Calories/Kcals/Kg                          25-30


      Kcals Calculated                           5746-2170


      Protein:                                   Using Current wt


      Protein g/k-1.1


      Protein Calculated                         60-66


      Fluid: ml                                  1500-1800ml (1ml/kcal)


     Nutritional Problem


      No current Nutrition Prob


       Problem                                   N/A


     Malnutrition Alert


      Is there a minimum of two criteria         No


       selected?                                 


       Query Text:Check all the applicable       


       criteria. A minimum of two criteria are   


       recommended for diagnosis of either       


       severe or non-severe malnutrition.        


     Malnutrition Related to Morbid Obesity


      Malnutrition related to morbid obesity     No


     Intervention/Recommendation


      Comments                                   1. Continue with pureed diet


                                                 as ordered. Monitor blood


                                                 sugar.


                                                 2. Monitor PO intake, wt, labs


                                                 and skin integrity


                                                 3. F/U as low risk in 7 days,


                                                 


     Expected Outcomes/Goals


      Expected Outcomes/Goals                    1. PO intake continue to meet


                                                 at least 75% of nutritional


                                                 needs.


                                                 2. Wt stability, skin to


                                                 remain intact, labs to


                                                 approach WNL.

## 2018-05-17 NOTE — INTERNAL MEDICINE PROG NOTE
Internal Medicine Subjective





- Subjective


Service Date: 18


Patient seen and examined:: without staff


Patient is:: awake, verbal, in bed, confused


Per staff patient has:: no adverse event





Internal Medicine Objective





- Results


Result Diagrams: 


 18 15:31





 18 15:31


Recent Labs: 


 Laboratory Last Values











WBC  7.9 Th/cmm (4.8-10.8)   18  15:31    


 


RBC  4.66 Mil/cmm (3.80-5.80)   18  15:31    


 


Hgb  14.9 gm/dL (12-16)   18  15:31    


 


Hct  44.9 % (41.0-60)   18  15:31    


 


MCV  96.3 fl (80-99)   18  15:31    


 


MCH  32.0 pg (27.0-31.0)  H  18  15:31    


 


MCHC Differential  33.3 pg (28.0-36.0)   18  15:31    


 


RDW  13.2 % (11.5-20.0)   18  15:31    


 


Plt Count  320 Th/cmm (150-400)   18  15:31    


 


MPV  7.0 fl  18  15:31    


 


Neutrophils %  63.5 % (40.0-80.0)   18  15:31    


 


Lymphocytes %  29.1 % (20.0-50.0)   18  15:31    


 


Monocytes %  4.4 % (2.0-10.0)   18  15:31    


 


Eosinophils %  2.3 % (0.0-5.0)   18  15:31    


 


Basophils %  0.7 % (0.0-2.0)   18  15:31    


 


Sodium  139 mEq/L (136-145)   18  15:31    


 


Potassium  5.3 mEq/L (3.5-5.1)  H  18  15:31    


 


Chloride  101 mEq/L ()   18  15:31    


 


Carbon Dioxide  28.4 mEq/L (21.0-31.0)   18  15:31    


 


Anion Gap  14.9  (7.0-16.0)   18  15:31    


 


BUN  12 mg/dL (7-25)   18  15:31    


 


Creatinine  0.7 mg/dL (0.7-1.3)   18  15:31    


 


Est GFR ( Amer)  > 60.0 ml/min (>90)   18  15:31    


 


Est GFR (Non-Af Amer)  > 60.0 ml/min  18  15:31    


 


BUN/Creatinine Ratio  17.1   18  15:31    


 


Glucose  134 mg/dL ()  H  18  15:31    


 


Calcium  9.8 mg/dL (8.6-10.3)   18  15:31    


 


Magnesium  2.3 mg/dL (1.9-2.7)   18  15:31    


 


Total Bilirubin  0.2 mg/dL (0.3-1.0)  L  18  15:31    


 


AST  12 U/L (13-39)  L  18  15:31    


 


ALT  12 U/L (7-52)   18  15:31    


 


Alkaline Phosphatase  83 U/L ()   18  15:31    


 


Troponin I  < 0.01 ng/mL (0.01-0.05)  L  18  15:31    


 


B-Natriuretic Peptide  43.8 pg/mL (5.0-100.0)   18  15:31    


 


Total Protein  7.6 gm/dL (6.0-8.3)   18  15:31    


 


Albumin  3.9 gm/dL (4.2-5.5)  L  18  15:31    


 


Globulin  3.7 gm/dL  18  15:31    


 


Albumin/Globulin Ratio  1.1  (1.0-1.8)   18  15:    


 


TSH  1.11 uIU/ml (0.34-5.60)   18  15:    


 


Valproic Acid  36.2 ug/mL (50.0-100.0)  L  18  10:52    


 


RPR  NONREACTIVE  (NONREACTIVE)   18  15:31    














- Physical Exam


Vitals and I&O: 


 Vital Signs











Temp  98.1 F   18 20:00


 


Pulse  68   18 20:00


 


Resp  19   18 20:00


 


BP  112/76   18 20:00


 


Pulse Ox  96   18 15:34











Active Medications: 


Current Medications





Acetaminophen (Tylenol)  650 mg PO Q4HR PRN


   PRN Reason: Mild Pain / Temp above 100


   Stop: 07/10/18 19:50


   Last Admin: 18 09:57 Dose:  650 mg


Carbidopa/Levodopa (Sinemet 25mg-100 Mg)  2 tab PO TID RUY


   Stop: 07/10/18 20:59


   Last Admin: 18 20:33 Dose:  2 tab


Docusate Sodium (Colace)  100 mg PO BID RUY


   Stop: 18 08:59


   Last Admin: 18 16:20 Dose:  Not Given


Lorazepam (Ativan)  0.5 mg PO Q4HR PRN; Protocol


   PRN Reason: Anxiety


   Stop: 06/10/18 21:55


   Last Admin: 05/15/18 21:12 Dose:  0.5 mg


Magnesium Hydroxide (Milk Of Magnesia)  30 ml PO HS PRN


   PRN Reason: Constipation


   Stop: 07/10/18 19:50


Multivitamins/Vitamin C (Theragran)  1 tab PO DAILY RUY


   Stop: 18 08:59


   Last Admin: 18 08:52 Dose:  1 tab


Quetiapine Fumarate (Seroquel)  25 mg PO HS RUY


   PRN Reason: Protocol


   Stop: 18 13:32


   Last Admin: 18 20:33 Dose:  25 mg


Valproate Sodium (Depakene)  500 mg PO BID RUY


   Stop: 18 08:59


   Last Admin: 18 16:21 Dose:  Not Given


Zolpidem Tartrate (Ambien)  5 mg PO HS PRN


   PRN Reason: Insomnia


   Stop: 07/10/18 19:50


   Last Admin: 18 20:50 Dose:  5 mg








General: demented


HEENT: anicteric sclerae, throat clear


Neck: Supple, No JVD, No thyromegaly, +2 carotid pulse wo bruit, No LAD


Cardiovascular: RRR, Normal S1, Normal S2, without murmur


Abdomen: soft, non-tender, non-distended


Neurological: no change





- Procedures


Procedures: 


 Procedures











Procedure Code Date


 


CHANGE FEEDING DEVICE IN UP INTEST TRACT, EXTERN APPROACH [id number] 17


 


GROUP PSYCHOTHERAPY 93423 16


 


GROUP PSYCHOTHERAPY [id number] 16


 


OTHER GROUP THERAPY 94.44 13


 


RECREATIONAL THERAPY 93.81 12














Internal Medicine Assmt/Plan





- Assessment


Assessment: 





1.parkinsons disease.


2.djd.


3.dementia.


4.psychosis.





- Plan


Plan: 





continue on current medication and diet.





Nutritional Asmnt/Malnutr-PDOC





- Dietary Evaluation


Malnutrition Findings (Please click <Entered> for more info): 








Nutritional Asmnt/Malnutrition                             Start:  18 15:

02


Text:                                                      Status: Complete    

  


Freq:                                                                          

  


 Document     18 15:04  LYLY  (Rec: 18 15:10  LYLY  [id number])


 Nutritional Asmnt/Malnutrition


     Patient General Information


      Nutritional Screening                      Moderate Risk


      Diagnosis                                  psychosis NOS


      Pertinent Medical Hx/Surgical Hx           dementia, psychosis, parkinson


                                                 's disease, DJD


      Subjective Information                     pt seen sitting in jeremias-chair


                                                 in hallway, very confused, not


                                                 able to communicate. Per


                                                 nurse, pt eats well.


      Current Diet Order/ Nutrition Support      pureed


      Pertinent Medications                      colace, theragran, serqoquel


      Pertinent Labs                              K 5.2, glucose 134


     Nutritional Hx/Data


      Height                                     1.75 m


      Height (Calculated Centimeters)            175.3


      Current Weight (lbs)                       60.328 kg


      Weight (Calculated Kilograms)              60.3


      Weight (Calculated Grams)                  75026.8


      Ideal Body Weight                          160


      % Ideal Body Weight                        83


      Body Mass Index (BMI)                      19.6


      Weight Status                              Approriate


     GI Symptoms


      GI Symptoms                                None


      Last BM                                    5/15


      Difficult in:                              None


      Skin Integrity/Comment:                    ald gtube stoma closed, skin


                                                 intact


      Current %PO                                Good (%)


     Estimated Nutritional Goals


      BEE in Kcals:                              Using Current wt


      Calories/Kcals/Kg                          25-30


      Kcals Calculated                           [id number]


      Protein:                                   Using Current wt


      Protein g/k-1.1


      Protein Calculated                         60-66


      Fluid: ml                                  1500-1800ml (1ml/kcal)


     Nutritional Problem


      No current Nutrition Prob


       Problem                                   N/A


     Malnutrition Alert


      Is there a minimum of two criteria         No


       selected?                                 


       Query Text:Check all the applicable       


       criteria. A minimum of two criteria are   


       recommended for diagnosis of either       


       severe or non-severe malnutrition.        


     Malnutrition Related to Morbid Obesity


      Malnutrition related to morbid obesity     No


     Intervention/Recommendation


      Comments                                   1. Continue with pureed diet


                                                 as ordered. Monitor blood


                                                 sugar.


                                                 2. Monitor PO intake, wt, labs


                                                 and skin integrity


                                                 3. F/U as low risk in 7 days,


                                                 


     Expected Outcomes/Goals


      Expected Outcomes/Goals                    1. PO intake continue to meet


                                                 at least 75% of nutritional


                                                 needs.


                                                 2. Wt stability, skin to


                                                 remain intact, labs to


                                                 approach WNL.

## 2018-05-17 NOTE — PROGRESS NOTES
DATE:  05/16/2018



SUBJECTIVE:  Staff was spoken to.  The patient is interviewed.  Mood is noted to

be irritable.  Affect is constricted.  Insight and judgment are noted to be

still impaired.  The patient is grossly confused and needs to be redirected

constantly.  Coping skills are noted to be very poor.  The patient has no

insight into his illness.  The patient is being closely monitored at this time

for his impulsive behavior and plan to continue the patient with the Seroquel

that is being given 25 mg and valproic acid is being given 500 mg twice a day.



ASSESSMENT:  The patient is still impulsive and confused.



PLAN:  To continue the patient with the supportive therapy and followup.





DD: 05/16/2018 20:19

DT: 05/16/2018 22:43

JOB# [id number]  [id number]

## 2018-05-18 NOTE — PROGRESS NOTES
DATE:  05/17/2018



SUBJECTIVE:  Staff was spoken to.  The patient is interviewed.  Mood is noted to

be irritable.  Affect is constricted.  Insight and judgment are noted to be

still impaired.  Impulse control is poor.  The patient compared to the time of

admission is less irritable.  Screaming and yelling episodes have been going

down.  The patient is currently on 500 mg of the valproic acid and 25 mg of the

Seroquel at bedtime.  The patient is able to tolerate the medications.  No side

effects of the medications are noted.



ASSESSMENT:  The patient is still irritable and psychotic.



PLAN:  To continue the patient with the supportive therapy and followup.





DD: 05/17/2018 19:31

DT: 05/18/2018 01:14

JOB# [id number]  [id number]

## 2018-05-19 NOTE — DISCHARGE SUMMARY
DATE OF DISCHARGE:  05/18/2018



PSYCHIATRIC DISCHARGE SUMMARY



IDENTIFYING DATA:  The patient is a 72-year-old  male, resident of

[facility].



JUSTIFICATION FOR HOSPITALIZATION:  The patient is admitted on a voluntary basis

in view of his agitation and disruptive behavior.



CHIEF COMPLAINT:  "I do not know, I had to leave right away."



DIAGNOSIS AT THE TIME OF ADMISSION:

AXIS I:

A.  Psychotic disorder, not otherwise specified.

B.  Dementia and behavioral change, secondary trait.

AXIS II:  None.

AXIS III:  As per Dr. Bridges.



HISTORY OF PRESENT ILLNESS:  Please refer to 05/12/2018 dictation done by me. 

Physical examination was done by Dr. Bridges and is noted to be significant for

Parkinson's disease.



HOSPITAL COURSE AND RESPONSE TO TREATMENT:  The patient has been observed on the

inpatient unit, provided with supportive psychotherapy.  The patient has been

closely monitored.  The patient has been placed on carbidopa-levodopa for his

Parkinson's and has been given 25 mg of Seroquel at bedtime, 5 mg of valproic

acid for his impulse control problems.  The patient, with these medications, has

been observed and was noted to be doing fairly well and the patient was finally

discharged on 05/18/2018 with the recommendation that he is going to be seeking

treatment on an outpatient basis.



MENTAL STATUS EXAMINATION AT THE TIME OF DISCHARGE:  The patient's mood is noted

to be less irritable.  Affect is appropriate.  Denies any active hallucinations.

 No delusions are noted.  Insight and judgment are noted to be fair.  Impulse

control is noted to be fair.



DIAGNOSES AT THE TIME OF DISCHARGE:

AXIS I:  Psychotic disorder, otherwise specified.

AXIS II:  None.

AXIS III:  Parkinson's disease and degenerative joint disease.



AFTERCARE PLAN:  The patient is discharged to [facility] to be followed up at [facility] by Dr. Avitia.





DD: 05/18/2018 19:10

DT: 05/19/2018 02:27

JOB# [id number]  [id number]

## 2018-11-09 ENCOUNTER — HOSPITAL ENCOUNTER (INPATIENT)
Dept: HOSPITAL 36 - ER | Age: 73
LOS: 10 days | Discharge: NURSING, REHAB OR LONG-TERM CARE | DRG: 885 | End: 2018-11-19
Attending: PSYCHIATRY & NEUROLOGY | Admitting: PSYCHIATRY & NEUROLOGY
Payer: MEDICARE

## 2018-11-09 VITALS — DIASTOLIC BLOOD PRESSURE: 58 MMHG | SYSTOLIC BLOOD PRESSURE: 110 MMHG

## 2018-11-09 DIAGNOSIS — G20: ICD-10-CM

## 2018-11-09 DIAGNOSIS — Z66: ICD-10-CM

## 2018-11-09 DIAGNOSIS — F29: ICD-10-CM

## 2018-11-09 DIAGNOSIS — E11.40: ICD-10-CM

## 2018-11-09 DIAGNOSIS — F31.64: Primary | ICD-10-CM

## 2018-11-09 DIAGNOSIS — Z88.2: ICD-10-CM

## 2018-11-09 DIAGNOSIS — R13.10: ICD-10-CM

## 2018-11-09 DIAGNOSIS — E11.9: ICD-10-CM

## 2018-11-09 DIAGNOSIS — F02.80: ICD-10-CM

## 2018-11-09 DIAGNOSIS — M35.3: ICD-10-CM

## 2018-11-09 DIAGNOSIS — E78.5: ICD-10-CM

## 2018-11-09 DIAGNOSIS — G30.9: ICD-10-CM

## 2018-11-09 DIAGNOSIS — M19.90: ICD-10-CM

## 2018-11-09 DIAGNOSIS — E05.90: ICD-10-CM

## 2018-11-09 DIAGNOSIS — K21.9: ICD-10-CM

## 2018-11-09 LAB
ALBUMIN SERPL-MCNC: 3.7 GM/DL (ref 4.2–5.5)
ALBUMIN/GLOB SERPL: 1.1 {RATIO} (ref 1–1.8)
ALP SERPL-CCNC: 69 U/L (ref 34–104)
ALT SERPL-CCNC: 10 U/L (ref 7–52)
ANION GAP SERPL CALC-SCNC: 12.7 MMOL/L (ref 7–16)
AST SERPL-CCNC: 12 U/L (ref 13–39)
BASOPHILS # BLD AUTO: 0.1 TH/CUMM (ref 0–0.2)
BASOPHILS NFR BLD AUTO: 0.7 % (ref 0–2)
BILIRUB SERPL-MCNC: 0.3 MG/DL (ref 0.3–1)
BUN SERPL-MCNC: 16 MG/DL (ref 7–25)
CALCIUM SERPL-MCNC: 9.1 MG/DL (ref 8.6–10.3)
CHLORIDE SERPL-SCNC: 103 MEQ/L (ref 98–107)
CO2 SERPL-SCNC: 27.6 MEQ/L (ref 21–31)
CREAT SERPL-MCNC: 0.7 MG/DL (ref 0.7–1.3)
EOSINOPHIL # BLD AUTO: 0.1 TH/CMM (ref 0.1–0.4)
EOSINOPHIL NFR BLD AUTO: 1.6 % (ref 0–5)
ERYTHROCYTE [DISTWIDTH] IN BLOOD BY AUTOMATED COUNT: 14.2 % (ref 11.5–20)
GLOBULIN SER-MCNC: 3.3 GM/DL
GLUCOSE SERPL-MCNC: 93 MG/DL (ref 70–105)
HCT VFR BLD CALC: 41 % (ref 41–60)
HGB BLD-MCNC: 13.8 GM/DL (ref 12–16)
LYMPHOCYTE AB SER FC-ACNC: 1.9 TH/CMM (ref 1.5–3)
LYMPHOCYTES NFR BLD AUTO: 26.6 % (ref 20–50)
MAGNESIUM SERPL-MCNC: 2.2 MG/DL (ref 1.9–2.7)
MCH RBC QN AUTO: 31.4 PG (ref 27–31)
MCHC RBC AUTO-ENTMCNC: 33.6 PG (ref 28–36)
MCV RBC AUTO: 93.6 FL (ref 80–99)
MONOCYTES # BLD AUTO: 0.5 TH/CMM (ref 0.3–1)
MONOCYTES NFR BLD AUTO: 7.2 % (ref 2–10)
NEUTROPHILS # BLD: 4.7 TH/CMM (ref 1.8–8)
NEUTROPHILS NFR BLD AUTO: 63.9 % (ref 40–80)
PHOSPHATE SERPL-MCNC: 3.4 MG/DL (ref 2.5–5)
PLATELET # BLD: 234 TH/CMM (ref 150–400)
PMV BLD AUTO: 7.2 FL
POTASSIUM SERPL-SCNC: 4.3 MEQ/L (ref 3.5–5.1)
RBC # BLD AUTO: 4.38 MIL/CMM (ref 3.8–5.8)
SODIUM SERPL-SCNC: 139 MEQ/L (ref 136–145)
WBC # BLD AUTO: 7.3 TH/CMM (ref 4.8–10.8)

## 2018-11-09 PROCEDURE — Z7610: HCPCS

## 2018-11-10 RX ADMIN — OYSTER SHELL CALCIUM WITH VITAMIN D SCH TAB: 500; 200 TABLET, FILM COATED ORAL at 17:18

## 2018-11-10 RX ADMIN — ASPIRIN 81 MG SCH MG: 81 TABLET ORAL at 09:37

## 2018-11-10 RX ADMIN — Medication SCH TAB: at 09:36

## 2018-11-10 NOTE — GENERAL PROGRESS NOTE
Subjective





- Review of Systems


Service Date: 11/10/18


Subjective: 





awake and communicative


sitting in chair


confused





Objective





- Results


Result Diagrams: 


 11/09/18 18:27 11/09/18 18:27


Recent Labs: 


 Laboratory Last Values











WBC  7.3 Th/cmm (4.8-10.8)   11/09/18  18:27    


 


RBC  4.38 Mil/cmm (3.80-5.80)   11/09/18  18:27    


 


Hgb  13.8 gm/dL (12-16)   11/09/18  18:27    


 


Hct  41.0 % (41.0-60)   11/09/18 18:27    


 


MCV  93.6 fl (80-99)   11/09/18 18:27    


 


MCH  31.4 pg (27.0-31.0)  H  11/09/18 18:27    


 


MCHC Differential  33.6 pg (28.0-36.0)   11/09/18 18:27    


 


RDW  14.2 % (11.5-20.0)   11/09/18 18:27    


 


Plt Count  234 Th/cmm (150-400)   11/09/18  18:27    


 


MPV  7.2 fl  11/09/18  18:27    


 


Neutrophils %  63.9 % (40.0-80.0)   11/09/18 18:27    


 


Lymphocytes %  26.6 % (20.0-50.0)   11/09/18 18:27    


 


Monocytes %  7.2 % (2.0-10.0)   11/09/18  18:27    


 


Eosinophils %  1.6 % (0.0-5.0)   11/09/18 18:27    


 


Basophils %  0.7 % (0.0-2.0)   11/09/18  18:27    


 


Sodium  139 mEq/L (136-145)   11/09/18  18:27    


 


Potassium  4.3 mEq/L (3.5-5.1)   11/09/18  18:27    


 


Chloride  103 mEq/L ()   11/09/18  18:27    


 


Carbon Dioxide  27.6 mEq/L (21.0-31.0)   11/09/18  18:27    


 


Anion Gap  12.7  (7.0-16.0)   11/09/18  18:27    


 


BUN  16 mg/dL (7-25)   11/09/18  18:27    


 


Creatinine  0.7 mg/dL (0.7-1.3)   11/09/18  18:27    


 


Est GFR ( Amer)  TNP   11/09/18  18:27    


 


Est GFR (Non-Af Amer)  TNP   11/09/18  18:27    


 


BUN/Creatinine Ratio  22.9   11/09/18  18:27    


 


Glucose  93 mg/dL ()   11/09/18  18:27    


 


Calcium  9.1 mg/dL (8.6-10.3)   11/09/18  18:27    


 


Phosphorus  3.4 mg/dL (2.5-5.0)   11/09/18  18:27    


 


Magnesium  2.2 mg/dL (1.9-2.7)   11/09/18  18:27    


 


Total Bilirubin  0.3 mg/dL (0.3-1.0)   11/09/18  18:27    


 


AST  12 U/L (13-39)  L  11/09/18  18:27    


 


ALT  10 U/L (7-52)   11/09/18  18:27    


 


Alkaline Phosphatase  69 U/L ()   11/09/18  18:27    


 


Total Protein  7.0 gm/dL (6.0-8.3)   11/09/18  18:27    


 


Albumin  3.7 gm/dL (4.2-5.5)  L  11/09/18  18:27    


 


Globulin  3.3 gm/dL  11/09/18  18:27    


 


Albumin/Globulin Ratio  1.1  (1.0-1.8)   11/09/18  18:27    


 


TSH  1.81 uIU/ml (0.34-5.60)   11/09/18  18:27    


 


Valproic Acid  14.1 ug/mL (50.0-100.0)  L  11/09/18  18:27    














- Physical Exam


Vitals and I&O: 


 Vital Signs











Temp  98.6 F   11/10/18 05:59


 


Pulse  74   11/10/18 05:59


 


Resp  18   11/10/18 05:59


 


BP  120/82   11/10/18 05:59


 


Pulse Ox  98   11/10/18 05:59








 Intake & Output











 11/09/18 11/10/18 11/10/18





 18:59 06:59 18:59


 


Weight (lbs) 62.596 kg  


 


Other:   


 


  Weight Source Estimated  











Active Medications: 


Current Medications





Acetaminophen (Tylenol)  650 mg PO Q4H PRN


   PRN Reason: Mild Pain / Temp above 100


   Stop: 01/08/19 21:52


Aspirin (Aspirin Chewable)  81 mg PO DAILY formerly [facility]


   Stop: 01/09/19 08:59


   Last Admin: 11/10/18 09:37 Dose:  81 mg


Calcium/Vitamin D (Oscal W/Vitamin D)  1 tab PO QPM formerly [facility]


   Stop: 01/09/19 16:59


Carbidopa/Levodopa (Sinemet 25mg-100 Mg)  2 tab PO TID RUY


   Stop: 01/09/19 08:59


   Last Admin: 11/10/18 14:43 Dose:  2 tab


Docusate Sodium (Colace)  100 mg PO BID formerly [facility]


   Stop: 01/09/19 08:59


   Last Admin: 11/10/18 09:37 Dose:  100 mg


Famotidine (Pepcid)  20 mg PO DAILY RUY


   Stop: 01/09/19 08:59


   Last Admin: 11/10/18 09:37 Dose:  20 mg


Lorazepam (Ativan)  0.5 mg PO Q4H PRN; Protocol


   PRN Reason: Anxiety


   Stop: 01/08/19 21:48


   Last Admin: 11/10/18 00:42 Dose:  0.5 mg


Magnesium Hydroxide (Milk Of Magnesia)  30 ml PO HS PRN


   PRN Reason: Constipation


   Stop: 01/08/19 21:55


Quetiapine Fumarate (Seroquel)  12.5 mg PO HS RUY; Protocol


   Stop: 01/09/19 20:59


Valproate Sodium (Depakene)  500 mg PO BID formerly [facility]


   Stop: 01/09/19 08:59


   Last Admin: 11/10/18 09:36 Dose:  500 mg


Zolpidem Tartrate (Ambien)  5 mg PO HS PRN


   PRN Reason: Insomnia


   Stop: 01/08/19 21:48








General: No acute distress


HEENT: Atraumatic, PERRLA


Neck: Supple, JVD, Thyromegaly


Cardiovascular: Regular rate, Normal S1, Normal S2


Lungs: Clear to auscultation


Abdomen: Bowel sounds, Soft





- Procedures


Procedures: 


 Procedures











Procedure Code Date


 


CHANGE FEEDING DEVICE IN UP INTEST TRACT, EXTERN APPROACH [id number] 11/19/17


 


GROUP PSYCHOTHERAPY 32540 02/16/16


 


GROUP PSYCHOTHERAPY [id number] 02/16/16


 


OTHER GROUP THERAPY 94.44 04/04/13


 


RECREATIONAL THERAPY 93.81 07/30/12














Assessment/Plan





- Assessment


Assessment: 





Parkinson's disease


DJD


dementia





- Plan


Plan: 





continue current treatment

## 2018-11-10 NOTE — HISTORY & PHYSICAL
ADMIT DATE:  



HISTORY OF PRESENT ILLNESS:  This 73-year-old male with long history of

Parkinson disease, degenerative joint disease, dementia, admitted to [place]

under Dr. Coe's service for evaluation and treatment.  There is no chest

pain, shortness of breath, nausea, vomiting, fever, or chills.



PAST MEDICAL HISTORY:  Significant for Parkinson disease, degenerative joint

disease, and dementia.



PAST SURGICAL HISTORY:  No recent surgery.



ALLERGIES:  SULFA.



MEDICATIONS:  Follow admission reconciliation.



SOCIAL HISTORY:  No smoking, no alcohol, no drug.



FAMILY HISTORY:  Noncontributory.



REVIEW OF SYSTEMS:

IMMUNO SYSTEM:  No history of chronic immune disorder.

CARDIOVASCULAR SYSTEM:  No coronary artery disease.

ENDOCRINE SYSTEM:  No diabetes or thyroid problem.

GASTROINTESTINAL SYSTEM:  No upper or lower GI bleed.

NEUROLOGICAL SYSTEM:  No seizure disorder.

SKELETOMUSCULAR SYSTEM:  He has degenerative joint disease.

RESPIRATORY SYSTEM:  No asthma.

GENITOURINARY:  No dysuria or hematuria.

HEMATOLOGIC SYSTEM:  No bleeding tendency.



PHYSICAL EXAMINATION:

GENERAL:  He is awake, not coherent.

VITAL SIGNS:  Temperature is 97.8, heart rate 82, blood pressure 110/58.

HEENT:  Normocephalic.  Pupils reacting equal to light and accommodation. 

Sclerae clear.

NECK:  Supple.  Negative for lymphadenopathy, JVD, or bruit.

CHEST:  Entry of air bilaterally normal.  No rhonchi or wheezing.

HEART:  S1, S2 normal.  No gallop rhythm.

ABDOMEN:  Soft, bowel sounds positive.

EXTREMITIES:  No edema.

NEUROLOGIC:  He is awake, alert, not fully oriented.  No focal motor or sensory

deficits.



LABORATORY DATA:  White blood cell 7.3, hemoglobin 13.8, hematocrit 41.0,

platelets 234.  Sodium 139, potassium 4.3, BUN 16, creatinine 0.7.



ASSESSMENT:

1.  Parkinson disease.

2.  Degenerative joint disease.

3.  Dementia.

4.  Psychosis.



PLAN:  The patient was admitted to the hospital under Dr. Coe's service. 

Medical problem to during hospitalization are psychosis.  Medical problems

addressed at discharge are Parkinson disease, degenerative joint disease, and

dementia.  The patient is medically stable for activity.



Thank you, Dr. Coe, for asking me to your patient.  The patient is a full

code.





DD: 11/09/2018 22:32

DT: 11/10/2018 00:13

JOB# [id number]  [id number]

## 2018-11-11 RX ADMIN — ASPIRIN 81 MG SCH MG: 81 TABLET ORAL at 08:37

## 2018-11-11 RX ADMIN — Medication SCH TAB: at 08:38

## 2018-11-11 RX ADMIN — OYSTER SHELL CALCIUM WITH VITAMIN D SCH TAB: 500; 200 TABLET, FILM COATED ORAL at 16:13

## 2018-11-11 NOTE — GENERAL PROGRESS NOTE
Subjective





- Review of Systems


Service Date: 11/11/18


Subjective: 





awake and communicative


sitting in chair


confused





Objective





- Results


Result Diagrams: 


 11/09/18 18:27 11/09/18 18:27


Recent Labs: 


 Laboratory Last Values











WBC  7.3 Th/cmm (4.8-10.8)   11/09/18  18:27    


 


RBC  4.38 Mil/cmm (3.80-5.80)   11/09/18  18:27    


 


Hgb  13.8 gm/dL (12-16)   11/09/18  18:27    


 


Hct  41.0 % (41.0-60)   11/09/18 18:27    


 


MCV  93.6 fl (80-99)   11/09/18 18:27    


 


MCH  31.4 pg (27.0-31.0)  H  11/09/18 18:27    


 


MCHC Differential  33.6 pg (28.0-36.0)   11/09/18 18:27    


 


RDW  14.2 % (11.5-20.0)   11/09/18 18:27    


 


Plt Count  234 Th/cmm (150-400)   11/09/18  18:27    


 


MPV  7.2 fl  11/09/18  18:27    


 


Neutrophils %  63.9 % (40.0-80.0)   11/09/18 18:27    


 


Lymphocytes %  26.6 % (20.0-50.0)   11/09/18 18:27    


 


Monocytes %  7.2 % (2.0-10.0)   11/09/18  18:27    


 


Eosinophils %  1.6 % (0.0-5.0)   11/09/18 18:27    


 


Basophils %  0.7 % (0.0-2.0)   11/09/18  18:27    


 


Sodium  139 mEq/L (136-145)   11/09/18  18:27    


 


Potassium  4.3 mEq/L (3.5-5.1)   11/09/18  18:27    


 


Chloride  103 mEq/L ()   11/09/18 18:27    


 


Carbon Dioxide  27.6 mEq/L (21.0-31.0)   11/09/18  18:27    


 


Anion Gap  12.7  (7.0-16.0)   11/09/18  18:27    


 


BUN  16 mg/dL (7-25)   11/09/18  18:27    


 


Creatinine  0.7 mg/dL (0.7-1.3)   11/09/18  18:27    


 


Est GFR ( Amer)  TNP   11/09/18  18:27    


 


Est GFR (Non-Af Amer)  TNP   11/09/18  18:27    


 


BUN/Creatinine Ratio  22.9   11/09/18  18:27    


 


Glucose  93 mg/dL ()   11/09/18  18:27    


 


Calcium  9.1 mg/dL (8.6-10.3)   11/09/18  18:27    


 


Phosphorus  3.4 mg/dL (2.5-5.0)   11/09/18  18:27    


 


Magnesium  2.2 mg/dL (1.9-2.7)   11/09/18  18:27    


 


Total Bilirubin  0.3 mg/dL (0.3-1.0)   11/09/18  18:27    


 


AST  12 U/L (13-39)  L  11/09/18  18:27    


 


ALT  10 U/L (7-52)   11/09/18  18:27    


 


Alkaline Phosphatase  69 U/L ()   11/09/18  18:27    


 


Total Protein  7.0 gm/dL (6.0-8.3)   11/09/18  18:27    


 


Albumin  3.7 gm/dL (4.2-5.5)  L  11/09/18  18:27    


 


Globulin  3.3 gm/dL  11/09/18  18:27    


 


Albumin/Globulin Ratio  1.1  (1.0-1.8)   11/09/18  18:27    


 


TSH  1.81 uIU/ml (0.34-5.60)   11/09/18  18:27    


 


Valproic Acid  14.1 ug/mL (50.0-100.0)  L  11/09/18  18:27    














- Physical Exam


Vitals and I&O: 


 Vital Signs











Temp  97.7 F   11/11/18 14:00


 


Pulse  93   11/11/18 14:00


 


Resp  20   11/11/18 14:00


 


BP  115/56   11/11/18 14:00


 


Pulse Ox  93   11/11/18 14:00








 Intake & Output











 11/10/18 11/11/18 11/11/18





 18:59 06:59 18:59


 


Intake Total  240 


 


Balance  240 


 


Intake:   


 


  Oral  240 


 


Other:   


 


  # Voids  2 


 


  # Bowel Movements  1 











Active Medications: 


Current Medications





Acetaminophen (Tylenol)  650 mg PO Q4H PRN


   PRN Reason: Mild Pain / Temp above 100


   Stop: 01/08/19 21:52


Aspirin (Aspirin Chewable)  81 mg PO DAILY [facility]


   Stop: 01/09/19 08:59


   Last Admin: 11/11/18 08:37 Dose:  81 mg


Calcium/Vitamin D (Oscal W/Vitamin D)  1 tab PO QPM [facility]


   Stop: 01/09/19 16:59


   Last Admin: 11/11/18 16:13 Dose:  1 tab


Carbidopa/Levodopa (Sinemet 25mg-100 Mg)  2 tab PO TID [facility]


   Stop: 01/09/19 08:59


   Last Admin: 11/11/18 13:08 Dose:  2 tab


Docusate Sodium (Colace)  100 mg PO BID [facility]


   Stop: 01/09/19 08:59


   Last Admin: 11/11/18 16:13 Dose:  100 mg


Famotidine (Pepcid)  20 mg PO DAILY [facility]


   Stop: 01/09/19 08:59


   Last Admin: 11/11/18 08:37 Dose:  20 mg


Lorazepam (Ativan)  0.5 mg PO Q4H PRN; Protocol


   PRN Reason: Anxiety


   Stop: 01/08/19 21:48


   Last Admin: 11/10/18 20:35 Dose:  0.5 mg


Magnesium Hydroxide (Milk Of Magnesia)  30 ml PO HS PRN


   PRN Reason: Constipation


   Stop: 01/08/19 21:55


Quetiapine Fumarate (Seroquel)  12.5 mg PO HS [facility]; Protocol


   Stop: 01/09/19 20:59


   Last Admin: 11/10/18 20:34 Dose:  12.5 mg


Valproate Sodium (Depakene)  500 mg PO BID [facility]


   Stop: 01/09/19 08:59


   Last Admin: 11/11/18 16:13 Dose:  500 mg


Zolpidem Tartrate (Ambien)  5 mg PO HS PRN


   PRN Reason: Insomnia


   Stop: 01/08/19 21:48








General: No acute distress


HEENT: Atraumatic, PERRLA


Neck: Supple, JVD, Thyromegaly


Cardiovascular: Regular rate, Normal S1, Normal S2


Lungs: Clear to auscultation


Abdomen: Bowel sounds, Soft





- Procedures


Procedures: 


 Procedures











Procedure Code Date


 


CHANGE FEEDING DEVICE IN UP INTEST TRACT, EXTERN APPROACH [id number] 11/19/17


 


GROUP PSYCHOTHERAPY 42277 02/16/16


 


GROUP PSYCHOTHERAPY [id number] 02/16/16


 


OTHER GROUP THERAPY 94.44 04/04/13


 


RECREATIONAL THERAPY 93.81 07/30/12














Assessment/Plan





- Assessment


Assessment: 





Parkinson's disease


DJD


dementia





- Plan


Plan: 





continue current treatment

## 2018-11-12 RX ADMIN — ASPIRIN 81 MG SCH MG: 81 TABLET ORAL at 09:12

## 2018-11-12 RX ADMIN — Medication SCH TAB: at 09:12

## 2018-11-12 RX ADMIN — OYSTER SHELL CALCIUM WITH VITAMIN D SCH TAB: 500; 200 TABLET, FILM COATED ORAL at 17:17

## 2018-11-12 NOTE — PROGRESS NOTES
DATE:  11/11/2018



PSYCHIATRIC PROGRESS NOTE



SUBJECTIVE:  Staff was spoken to.  The patient is interviewed.  Mood is noted to

be irritable.  Affect is constricted.  Insight and judgment at this time are

noted to be very much impaired.  Impulse control is noted to be poor.  Coping

skills are noted to be very poor.  The patient has been having difficult time to

cope with the stress.  The patient's speech is noted to be pressured.  Insight

and judgment are noted to be still impaired.



ASSESSMENT:  The patient is still having mood swings.



PLAN:  To continue the patient with the supportive therapy and followup.





DD: 11/11/2018 13:54

DT: 11/12/2018 04:05

JOB# [id number]  [id number]

## 2018-11-12 NOTE — INTERNAL MEDICINE PROG NOTE
Internal Medicine Subjective





- Subjective


Service Date: 11/12/18


Patient seen and examined:: without staff


Patient is:: awake, non-verbal, in bed, confused


Per staff patient has:: no adverse event





Internal Medicine Objective





- Results


Result Diagrams: 


 11/09/18 18:27 11/09/18 18:27


Recent Labs: 


 Laboratory Last Values











WBC  7.3 Th/cmm (4.8-10.8)   11/09/18 18:27    


 


RBC  4.38 Mil/cmm (3.80-5.80)   11/09/18  18:27    


 


Hgb  13.8 gm/dL (12-16)   11/09/18 18:27    


 


Hct  41.0 % (41.0-60)   11/09/18 18:27    


 


MCV  93.6 fl (80-99)   11/09/18 18:27    


 


MCH  31.4 pg (27.0-31.0)  H  11/09/18 18:27    


 


MCHC Differential  33.6 pg (28.0-36.0)   11/09/18 18:27    


 


RDW  14.2 % (11.5-20.0)   11/09/18 18:27    


 


Plt Count  234 Th/cmm (150-400)   11/09/18 18:27    


 


MPV  7.2 fl  11/09/18 18:27    


 


Neutrophils %  63.9 % (40.0-80.0)   11/09/18 18:27    


 


Lymphocytes %  26.6 % (20.0-50.0)   11/09/18 18:27    


 


Monocytes %  7.2 % (2.0-10.0)   11/09/18 18:27    


 


Eosinophils %  1.6 % (0.0-5.0)   11/09/18 18:27    


 


Basophils %  0.7 % (0.0-2.0)   11/09/18  18:27    


 


Sodium  139 mEq/L (136-145)   11/09/18  18:27    


 


Potassium  4.3 mEq/L (3.5-5.1)   11/09/18 18:27    


 


Chloride  103 mEq/L ()   11/09/18 18:27    


 


Carbon Dioxide  27.6 mEq/L (21.0-31.0)   11/09/18 18:27    


 


Anion Gap  12.7  (7.0-16.0)   11/09/18  18:27    


 


BUN  16 mg/dL (7-25)   11/09/18  18:27    


 


Creatinine  0.7 mg/dL (0.7-1.3)   11/09/18  18:27    


 


Est GFR ( Amer)  TNP   11/09/18  18:27    


 


Est GFR (Non-Af Amer)  TNP   11/09/18  18:27    


 


BUN/Creatinine Ratio  22.9   11/09/18  18:27    


 


Glucose  93 mg/dL ()   11/09/18  18:27    


 


Calcium  9.1 mg/dL (8.6-10.3)   11/09/18  18:27    


 


Phosphorus  3.4 mg/dL (2.5-5.0)   11/09/18  18:27    


 


Magnesium  2.2 mg/dL (1.9-2.7)   11/09/18  18:27    


 


Total Bilirubin  0.3 mg/dL (0.3-1.0)   11/09/18  18:27    


 


AST  12 U/L (13-39)  L  11/09/18  18:27    


 


ALT  10 U/L (7-52)   11/09/18  18:27    


 


Alkaline Phosphatase  69 U/L ()   11/09/18  18:27    


 


Total Protein  7.0 gm/dL (6.0-8.3)   11/09/18  18:27    


 


Albumin  3.7 gm/dL (4.2-5.5)  L  11/09/18  18:27    


 


Globulin  3.3 gm/dL  11/09/18  18:27    


 


Albumin/Globulin Ratio  1.1  (1.0-1.8)   11/09/18  18:27    


 


TSH  1.81 uIU/ml (0.34-5.60)   11/09/18  18:27    


 


Valproic Acid  14.1 ug/mL (50.0-100.0)  L  11/09/18  18:27    














- Physical Exam


Vitals and I&O: 


 Vital Signs











Temp  0 F   11/12/18 06:18


 


Pulse  93   11/11/18 20:32


 


Resp  20   11/11/18 20:32


 


BP  110/50   11/11/18 20:32


 


Pulse Ox  91   11/11/18 20:32








 Intake & Output











 11/12/18 11/12/18 11/13/18





 06:59 18:59 06:59


 


Intake Total 240  120


 


Balance 240  120


 


Intake:   


 


  Oral 240  120


 


Other:   


 


  # Voids 3  2


 


  # Bowel Movements 0 1 1











Active Medications: 


Current Medications





Acetaminophen (Tylenol)  650 mg PO Q4H PRN


   PRN Reason: Mild Pain / Temp above 100


   Stop: 01/08/19 21:52


Aspirin (Aspirin Chewable)  81 mg PO DAILY [facility]


   Stop: 01/09/19 08:59


   Last Admin: 11/12/18 09:12 Dose:  81 mg


Calcium/Vitamin D (Oscal W/Vitamin D)  1 tab PO QPM [facility]


   Stop: 01/09/19 16:59


   Last Admin: 11/12/18 17:17 Dose:  1 tab


Carbidopa/Levodopa (Sinemet 25mg-100 Mg)  2 tab PO TID [facility]


   Stop: 01/09/19 08:59


   Last Admin: 11/12/18 13:41 Dose:  2 tab


Docusate Sodium (Colace)  100 mg PO BID [facility]


   Stop: 01/09/19 08:59


   Last Admin: 11/12/18 17:17 Dose:  100 mg


Famotidine (Pepcid)  20 mg PO DAILY [facility]


   Stop: 01/09/19 08:59


   Last Admin: 11/12/18 09:12 Dose:  20 mg


Lorazepam (Ativan)  0.5 mg PO Q4H PRN; Protocol


   PRN Reason: Anxiety


   Stop: 01/08/19 21:48


   Last Admin: 11/10/18 20:35 Dose:  0.5 mg


Magnesium Hydroxide (Milk Of Magnesia)  30 ml PO HS PRN


   PRN Reason: Constipation


   Stop: 01/08/19 21:55


Quetiapine Fumarate (Seroquel)  25 mg PO HS [facility]; Protocol


   Stop: 01/11/19 20:59


Valproate Sodium (Depakene)  500 mg PO BID [facility]


   Stop: 01/09/19 08:59


   Last Admin: 11/12/18 17:17 Dose:  500 mg


Zolpidem Tartrate (Ambien)  5 mg PO HS PRN


   PRN Reason: Insomnia


   Stop: 01/08/19 21:48








General: demented


HEENT: NC/AT, PERRLA, EOMI, anicteric sclerae, throat clear


Neck: Supple, No JVD, No thyromegaly, +2 carotid pulse wo bruit, No LAD


Lungs: CTAB


Cardiovascular: RRR, Normal S1, Normal S2, without murmur


Abdomen: soft, non-tender, non-distended


Extremities: clear


Neurological: no change





- Procedures


Procedures: 


 Procedures











Procedure Code Date


 


CHANGE FEEDING DEVICE IN UP INTEST TRACT, EXTERN APPROACH [id number] 11/19/17


 


GROUP PSYCHOTHERAPY [id number] 02/16/16


 


GROUP PSYCHOTHERAPY [id number] 02/16/16


 


OTHER GROUP THERAPY 94.44 04/04/13


 


RECREATIONAL THERAPY 93.81 07/30/12














Internal Medicine Assmt/Plan





- Assessment


Assessment: 





1.PARKINSON DISEASE.


2.DJD.


3.DEMENTIA.


4.PSYCHSIS.








- Plan


Plan: 





CONTINUE ON CURRENT MEDICATION AND DIET.

## 2018-11-13 LAB
CHOLEST SERPL-MCNC: 218 MG/DL (ref ?–200)
HDLC SERPL-MCNC: 47 MG/DL (ref 23–92)
TRIGL SERPL-MCNC: 196 MG/DL (ref ?–150)

## 2018-11-13 RX ADMIN — Medication SCH TAB: at 08:44

## 2018-11-13 RX ADMIN — ASPIRIN 81 MG SCH MG: 81 TABLET ORAL at 08:44

## 2018-11-13 RX ADMIN — OYSTER SHELL CALCIUM WITH VITAMIN D SCH TAB: 500; 200 TABLET, FILM COATED ORAL at 16:18

## 2018-11-13 NOTE — PROGRESS NOTES
DATE:  11/13/2018



SUBJECTIVE:  Staff was spoken to.  The patient is interviewed.  Mood is noted to

be irritable.  Affect is constricted.  Insight and judgment at this time are

noted to be still impaired.  Impulse control is noted to be limited.  Coping

skills are noted to be limited.  The patient has to be redirected constantly. 

No side effects to medications are noted.  The patient is currently on the

Seroquel and Depakote.  Depakote is being 500 mg twice a day, Seroquel 25 mg at

bedtime.  The patient has been able to tolerate the medications.



ASSESSMENT:  The patient is still impulsive and psychotic.



PLAN:  To continue the patient with the supportive therapy, encouraged the

patient to verbalize the concerns rather than to act out.





DD: 11/13/2018 09:00

DT: 11/13/2018 14:14

JOB# [id number]  [id number]

## 2018-11-13 NOTE — PROGRESS NOTES
DATE:  11/12/2018



SUBJECTIVE:  Staff spoke to.  The patient is interviewed.  Mood is noted to be

irritable.  Affect is constricted.  The patient is still having difficult time

to cope with the stress.  The patient is still yelling and screaming whenever he

does not get his way.  No side effects to the medications are noted at this

time.  The patient is currently on Seroquel, which is being given at 12.5 mg and

plan to increase the dose to 25 and continue the Depakote and follow the patient

with the supportive therapy.  Please note that the patient is not ready to be

discharged to a lower level of care in view of his acute mood swings.





DD: 11/12/2018 17:55

DT: 11/13/2018 02:28

JOB# [id number]  [id number]

## 2018-11-13 NOTE — INTERNAL MEDICINE PROG NOTE
Internal Medicine Subjective





- Subjective


Service Date: 18


Patient seen and examined:: with staff


Patient is:: awake, non-verbal, in bed, confused


Per staff patient has:: no adverse event





Internal Medicine Objective





- Results


Result Diagrams: 


 18 18:18 18:


Recent Labs: 


 Laboratory Last Values











WBC  7.3 Th/cmm (4.8-10.8)   18  18:    


 


RBC  4.38 Mil/cmm (3.80-5.80)   18  18:    


 


Hgb  13.8 gm/dL (12-16)   18:    


 


Hct  41.0 % (41.0-60)   18:    


 


MCV  93.6 fl (80-99)   18:    


 


MCH  31.4 pg (27.0-31.0)  H  18:    


 


MCHC Differential  33.6 pg (28.0-36.0)   18:    


 


RDW  14.2 % (11.5-20.0)   18:    


 


Plt Count  234 Th/cmm (150-400)   18  18:    


 


MPV  7.2 fl  18:    


 


Neutrophils %  63.9 % (40.0-80.0)   18:    


 


Lymphocytes %  26.6 % (20.0-50.0)   18:    


 


Monocytes %  7.2 % (2.0-10.0)   18:    


 


Eosinophils %  1.6 % (0.0-5.0)   18:    


 


Basophils %  0.7 % (0.0-2.0)   18  18:    


 


Sodium  139 mEq/L (136-145)   18  18:    


 


Potassium  4.3 mEq/L (3.5-5.1)   18  18:    


 


Chloride  103 mEq/L ()   18:    


 


Carbon Dioxide  27.6 mEq/L (21.0-31.0)   18:    


 


Anion Gap  12.7  (7.0-16.0)   18  18:    


 


BUN  16 mg/dL (7-25)   18  18:    


 


Creatinine  0.7 mg/dL (0.7-1.3)   18  18:    


 


Est GFR ( Amer)  TNP   18  18:    


 


Est GFR (Non-Af Amer)  TNP   18  18:    


 


BUN/Creatinine Ratio  22.9   18  18:    


 


Glucose  93 mg/dL ()   18  18:    


 


Calcium  9.1 mg/dL (8.6-10.3)   18:    


 


Phosphorus  3.4 mg/dL (2.5-5.0)   18:    


 


Magnesium  2.2 mg/dL (1.9-2.7)   18:    


 


Total Bilirubin  0.3 mg/dL (0.3-1.0)   18:    


 


AST  12 U/L (13-39)  L  18:    


 


ALT  10 U/L (7-52)   18:    


 


Alkaline Phosphatase  69 U/L ()   18:    


 


Total Protein  7.0 gm/dL (6.0-8.3)   18:    


 


Albumin  3.7 gm/dL (4.2-5.5)  L  18:    


 


Globulin  3.3 gm/dL  18:    


 


Albumin/Globulin Ratio  1.1  (1.0-1.8)   18  18:    


 


Triglycerides  196 mg/dL (<150)  H  18  18:    


 


Cholesterol  218 mg/dL (<200)  H  18  18:    


 


LDL Cholesterol Direct  138 mg/dL ()   18:    


 


HDL Cholesterol  47 mg/dL (23-92)   18:    


 


TSH  1.81 uIU/ml (0.34-5.60)   18  18:    


 


Valproic Acid  14.1 ug/mL (50.0-100.0)  L  18  18:    














- Physical Exam


Vitals and I&O: 


 Vital Signs











Temp  98.2 F   18 20:11


 


Pulse  78   18 20:11


 


Resp  18   18 20:11


 


BP  118/72   18 20:11


 


Pulse Ox  97   18 20:11








 Intake & Output











 18





 06:59 18:59 06:59


 


Intake Total 120  120


 


Balance 120  120


 


Intake:   


 


  Oral 120  120


 


Other:   


 


  # Voids 2  3


 


  # Bowel Movements 1  0











Active Medications: 


Current Medications





Acetaminophen (Tylenol)  650 mg PO Q4H PRN


   PRN Reason: Mild Pain / Temp above 100


   Stop: 19 21:52


Aspirin (Aspirin Chewable)  81 mg PO DAILY [facility]


   Stop: 19 08:59


   Last Admin: 18 08:44 Dose:  81 mg


Calcium/Vitamin D (Oscal W/Vitamin D)  1 tab PO QPM [facility]


   Stop: 19 16:59


   Last Admin: 18 16:18 Dose:  1 tab


Carbidopa/Levodopa (Sinemet 25mg-100 Mg)  2 tab PO TID [facility]


   Stop: 19 08:59


   Last Admin: 18 14:20 Dose:  2 tab


Docusate Sodium (Colace)  100 mg PO BID [facility]


   Stop: 19 08:59


   Last Admin: 18 16:18 Dose:  100 mg


Famotidine (Pepcid)  20 mg PO DAILY [facility]


   Stop: 19 08:59


   Last Admin: 18 08:44 Dose:  20 mg


Lorazepam (Ativan)  0.5 mg PO Q4H PRN; Protocol


   PRN Reason: Anxiety


   Stop: 19 21:48


   Last Admin: 11/10/18 20:35 Dose:  0.5 mg


Magnesium Hydroxide (Milk Of Magnesia)  30 ml PO HS PRN


   PRN Reason: Constipation


   Stop: 19 21:55


Quetiapine Fumarate (Seroquel)  25 mg PO HS [facility]; Protocol


   Stop: 19 20:59


   Last Admin: 18 20:39 Dose:  25 mg


Valproate Sodium (Depakene)  500 mg PO BID [facility]


   Stop: 19 08:59


   Last Admin: 18 16:18 Dose:  500 mg


Zolpidem Tartrate (Ambien)  5 mg PO HS PRN


   PRN Reason: Insomnia


   Stop: 19 21:48








General: demented


HEENT: NC/AT, PERRLA, EOMI, anicteric sclerae, throat clear


Neck: Supple, No JVD, No thyromegaly, +2 carotid pulse wo bruit, No LAD


Lungs: CTAB


Cardiovascular: RRR, Normal S1, Normal S2, without murmur


Abdomen: soft, non-tender, non-distended


Extremities: clear


Neurological: no change





- Procedures


Procedures: 


 Procedures











Procedure Code Date


 


CHANGE FEEDING DEVICE IN UP INTEST TRACT, EXTERN APPROACH [id number] 17


 


GROUP PSYCHOTHERAPY 52387 16


 


GROUP PSYCHOTHERAPY [id number] 16


 


OTHER GROUP THERAPY 94.44 13


 


RECREATIONAL THERAPY 93.81 12














Internal Medicine Assmt/Plan





- Assessment


Assessment: 





1.PARKINSON DISEASE.


2.DJD.


3.DEMENTIA.


4.PSYCHSIS.








- Plan


Plan: 





CONTINUE ON CURRENT MEDICATION AND DIET.





Nutritional Asmnt/Malnutr-PDOC





- Dietary Evaluation


Malnutrition Findings (Please click <Entered> for more info): 








Nutritional Asmnt/Malnutrition                             Start:  18 13:

53


Text:                                                      Status: Complete    

  


Freq:                                                                          

  


Protocol:                                                                      

  


 Document     18 13:53  [email or web address]  (Rec: 18 14:14  [email or web address]  [id number])


 Nutritional Asmnt/Malnutrition


     Patient General Information


      Nutritional Screening                      Moderate Risk


      Diagnosis                                  psychosis


      Pertinent Medical Hx/Surgical Hx           DM, dyslipidemia, PUD/GERD,


                                                 thyroid disorder, dementia,


                                                 alzheimer/s, parkinson/s,


                                                 polymyalgia rheumatica,


                                                 schizoaffective disorder,


                                                 neuropathy, psychosis


      Subjective Information                     Pt seen eating in dining room,


                                                 very confused with unclear


                                                 speech. Verified pt consumed


                                                 100% of lunch today. Per EMR,


                                                 PO intake mostly %.


      Current Diet Order/ Nutrition Support      CCHO 90gm, puree, large


                                                 portions, HPN three times a


                                                 day for supplement


      Pertinent Medications                      oscal w/vit D, colace, pepcid,


                                                 seroquel


      Pertinent Labs                              Alb 3.7, glucose 93


     Nutritional Hx/Data


      Height                                     1.83 m


      Height (Calculated Centimeters)            182.9


      Current Weight (lbs)                       62.596 kg


      Weight (Calculated Kilograms)              62.6


      Weight (Calculated Grams)                  98903.7


      Ideal Body Weight                          178


      Body Mass Index (BMI)                      18.7


      Weight Status                              Approriate


     GI Symptoms


      GI Symptoms                                None


      Last BM                                    11/12 x 2


      Difficult in:                              None


      Skin Integrity/Comment:                    intact


      Current %PO                                Good (%)


     Estimated Nutritional Goals


      BEE in Kcals:                              Using Current wt


      Calories/Kcals/Kg                          27-32


      Kcals Calculated                           5912-7560


      Protein:                                   Using Current wt


      Protein g/k-1.2


      Protein Calculated                         63-76


      Fluid: ml                                  -ml (1ml/kcal)


     Nutritional Problem


      No current Nutrition Prob


       Problem                                   N/A


     Malnutrition Alert


      Is there a minimum of two criteria         No


       selected?                                 


       Query Text:Check all the applicable       


       criteria. A minimum of two criteria are   


       recommended for diagnosis of either       


       severe or non-severe malnutrition.        


     Malnutrition Related to Morbid Obesity


      Malnutrition related to morbid obesity     No


     Intervention/Recommendation


      Comments                                   1. Recommend regular diet for


                                                 liberalization d/t glucose WNL


                                                 and pt is not on diabetic


                                                 medication.


                                                 2. Monitor PO intake, wt, labs


                                                 and skin integrity


                                                 3. F/U as low risk in 7 days,


                                                 


     Expected Outcomes/Goals


      Expected Outcomes/Goals                    1. PO intake to meet at least


                                                 75% of nutritional needs.


                                                 2. Wt stability, skin to


                                                 remain intact, labs to


                                                 approach WNL.

## 2018-11-13 NOTE — CONSULTATION
DATE OF CONSULTATION:     11/12/2018



REFERRING PHYSICIAN:  Lisa Coe MD



TYPE OF CONSULTATION:  Psychology.



HISTORY OF PRESENT ILLNESS:  The patient is a 73-year-old  male.  The

following is by review of the medical record and by the patient's self report. 

The patient is a resident of [facility].  The patient is known to

this writer from previous hospitalizations.  The patient is being admitted due

to acute agitation as well as aggressive behavior, i.e., striking out at staff. 

Upon interview, the patient seems to present with being preoccupied by female

staff.  The staff at the patient's facility report acute mood swings and

agitation as well as striking out and aggressive behavior.  The patient denied

any current suicidal ideation, plan or intention.  The patient states he does

not know why he is being hospitalized and that he needs to be discharged.



PAST MEDICAL HISTORY:  Please see history and physical by Dr. Bridges.



PAST PSYCHIATRIC HISTORY:  The patient has a history of bipolar disorder.  The

patient is under the care of a psychiatrist at his placement.



SUBSTANCE ABUSE HISTORY:  No history of alcohol, tobacco, or illicit drug use.



PSYCHOSOCIAL HISTORY:  The patient did not respond to these questions.  The

patient states no family involvement in his care and no identifiable support

system other than the staff at the patient's facility.  The patient did not

answer questions about history and physical or sexual abuse or current legal

problems.  The patient did not answer questions about occupational or

educational history or Buddhist affiliation.  The patient is requesting to be

returned to his placement.



MENTAL STATUS EXAMINATION:  The patient appears to be his stated age.  The

patient's attitude is guarded, but superficially cooperative.  Eye contact is

poor.  Speech is spontaneous.  Mood is irritable.  Affect is constricted. 

Thought process shows perseveration and possible responding to inner dialogue. 

The patient's speech became pressured and continued without stopping.  The

patient did not respond to the cognitive redirection.  The patient is presenting

with flight of ideas.  He denied any auditory or visual hallucinations.  The

patient had earlier denied any suicidal ideation, plan or intention.  The

patient's behavior has been poorly redirectable.  Impulse control is inadequate.

Sensorium is alert and oriented to self and place.  Concentration is poor.  The

patient did not participate in the memory assessment and was agitated and

unredirectable for the remainder of the clinical interview.  He did not

participate in the interpretation of proverbs.  Insight is impaired.  Judgment

is impaired.



DIAGNOSTIC IMPRESSION:

AXIS I:  Bipolar disorder, mixed with psychotic symptoms.

AXIS II:  Deferred.

AXIS III:  Per Dr. Bridges.



TREATMENT PLAN:  The patient has been seen by Dr. Coe for psychiatric

evaluation and for the management of the patient's psychotropic medications.  We

will provide reality orientation, differentiation and integration.  We will

provide de-escalation as well as limit setting.  We will provide boundary

definitions and awareness.  We will provide stress management to increase the

patient's frustration tolerance.  The patient's behavior presents as unwilling

to comply with treatment.  Therefore, motivational enhancement will be provided

daily to encourage the patient to become compliant and stay compliant with all

aspects of his care and treatment.  We will encourage the patient to be able to

demonstrate emotional and self-regulation prior to his discharge.  We will

require the patient to verbally contract for safety regarding harm to others as

well as harm to self prior to discharge.  We will provide coping strategies for

phase of life issues as well as for chronic severe long-term mental illness.



Thank you, Dr. Coe, for this consult and the opportunity to participate

in this patient's care.





DD: 11/13/2018 10:37

DT: 11/13/2018 20:43

JOB# [id number]  [id number]

ANAIS

## 2018-11-14 RX ADMIN — ASPIRIN 81 MG SCH MG: 81 TABLET ORAL at 08:51

## 2018-11-14 RX ADMIN — Medication SCH TAB: at 08:51

## 2018-11-14 RX ADMIN — OYSTER SHELL CALCIUM WITH VITAMIN D SCH TAB: 500; 200 TABLET, FILM COATED ORAL at 17:03

## 2018-11-14 NOTE — INTERNAL MEDICINE PROG NOTE
Internal Medicine Subjective





- Subjective


Service Date: 18


Patient seen and examined:: with staff


Patient is:: awake, non-verbal, in bed, confused


Per staff patient has:: no adverse event





Internal Medicine Objective





- Results


Result Diagrams: 


 18 18:18 18:


Recent Labs: 


 Laboratory Last Values











WBC  7.3 Th/cmm (4.8-10.8)   18:    


 


RBC  4.38 Mil/cmm (3.80-5.80)   18  18:    


 


Hgb  13.8 gm/dL (12-16)   18:    


 


Hct  41.0 % (41.0-60)   18:    


 


MCV  93.6 fl (80-99)   18:    


 


MCH  31.4 pg (27.0-31.0)  H  18:    


 


MCHC Differential  33.6 pg (28.0-36.0)   18:    


 


RDW  14.2 % (11.5-20.0)   18:    


 


Plt Count  234 Th/cmm (150-400)   18:    


 


MPV  7.2 fl  18:    


 


Neutrophils %  63.9 % (40.0-80.0)   18:    


 


Lymphocytes %  26.6 % (20.0-50.0)   18:    


 


Monocytes %  7.2 % (2.0-10.0)   18:    


 


Eosinophils %  1.6 % (0.0-5.0)   18:    


 


Basophils %  0.7 % (0.0-2.0)   18  18:    


 


Sodium  139 mEq/L (136-145)   18  18:    


 


Potassium  4.3 mEq/L (3.5-5.1)   18:    


 


Chloride  103 mEq/L ()   18:    


 


Carbon Dioxide  27.6 mEq/L (21.0-31.0)   18:    


 


Anion Gap  12.7  (7.0-16.0)   18  18:    


 


BUN  16 mg/dL (7-25)   18  18:    


 


Creatinine  0.7 mg/dL (0.7-1.3)   18  18:    


 


Est GFR ( Amer)  TNP   18  18:    


 


Est GFR (Non-Af Amer)  TNP   18  18:    


 


BUN/Creatinine Ratio  22.9   18  18:    


 


Glucose  93 mg/dL ()   18  18:    


 


Calcium  9.1 mg/dL (8.6-10.3)   18:    


 


Phosphorus  3.4 mg/dL (2.5-5.0)   18:    


 


Magnesium  2.2 mg/dL (1.9-2.7)   18:    


 


Total Bilirubin  0.3 mg/dL (0.3-1.0)   18:    


 


AST  12 U/L (13-39)  L  18:    


 


ALT  10 U/L (7-52)   18:    


 


Alkaline Phosphatase  69 U/L ()   18:    


 


Total Protein  7.0 gm/dL (6.0-8.3)   18:    


 


Albumin  3.7 gm/dL (4.2-5.5)  L  18:    


 


Globulin  3.3 gm/dL  18:    


 


Albumin/Globulin Ratio  1.1  (1.0-1.8)   18  18:    


 


Triglycerides  196 mg/dL (<150)  H  18  18:    


 


Cholesterol  218 mg/dL (<200)  H  18  18:    


 


LDL Cholesterol Direct  138 mg/dL ()   18:    


 


HDL Cholesterol  47 mg/dL (23-92)   18:    


 


TSH  1.81 uIU/ml (0.34-5.60)   18  18:    


 


Valproic Acid  14.1 ug/mL (50.0-100.0)  L  18  18:    














- Physical Exam


Vitals and I&O: 


 Vital Signs











Temp  97.6 F   18 14:00


 


Pulse  96   18 14:00


 


Resp  20   18 14:00


 


BP  123/70   18 14:00


 


Pulse Ox  97   18 14:00








 Intake & Output











 11/14/18 11/14/18 11/15/18





 06:59 18:59 06:59


 


Intake Total 180 800 


 


Balance 180 800 


 


Intake:   


 


  Oral 180 800 


 


Other:   


 


  # Voids 1 3 


 


  # Bowel Movements 1 1 











Active Medications: 


Current Medications





Acetaminophen (Tylenol)  650 mg PO Q4H PRN


   PRN Reason: Mild Pain / Temp above 100


   Stop: 19 21:52


Aspirin (Aspirin Chewable)  81 mg PO DAILY [facility]


   Stop: 19 08:59


   Last Admin: 18 08:51 Dose:  81 mg


Calcium/Vitamin D (Oscal W/Vitamin D)  1 tab PO QPM [facility]


   Stop: 19 16:59


   Last Admin: 18 17:03 Dose:  1 tab


Carbidopa/Levodopa (Sinemet 25mg-100 Mg)  2 tab PO TID [facility]


   Stop: 19 08:59


   Last Admin: 18 14:25 Dose:  2 tab


Docusate Sodium (Colace)  100 mg PO BID [facility]


   Stop: 19 08:59


   Last Admin: 18 17:03 Dose:  100 mg


Famotidine (Pepcid)  20 mg PO DAILY [facility]


   Stop: 19 08:59


   Last Admin: 18 08:51 Dose:  20 mg


Lorazepam (Ativan)  0.5 mg PO Q4H PRN; Protocol


   PRN Reason: Anxiety


   Stop: 19 21:48


   Last Admin: 11/10/18 20:35 Dose:  0.5 mg


Magnesium Hydroxide (Milk Of Magnesia)  30 ml PO HS PRN


   PRN Reason: Constipation


   Stop: 19 21:55


Quetiapine Fumarate (Seroquel)  50 mg PO HS [facility]; Protocol


   Stop: 19 20:59


Valproate Sodium (Depakene)  500 mg PO BID [facility]


   Stop: 19 08:59


   Last Admin: 18 17:03 Dose:  500 mg


Zolpidem Tartrate (Ambien)  5 mg PO HS PRN


   PRN Reason: Insomnia


   Stop: 19 21:48








General: demented


HEENT: NC/AT, PERRLA, EOMI, anicteric sclerae, throat clear


Neck: Supple, No JVD, No thyromegaly, +2 carotid pulse wo bruit, No LAD


Lungs: CTAB


Cardiovascular: RRR, Normal S1, Normal S2, without murmur


Abdomen: soft, non-tender, non-distended


Extremities: clear


Neurological: no change





- Procedures


Procedures: 


 Procedures











Procedure Code Date


 


CHANGE FEEDING DEVICE IN UP INTEST TRACT, EXTERN APPROACH [id number] 17


 


GROUP PSYCHOTHERAPY 31341 16


 


GROUP PSYCHOTHERAPY GZHZZZZ 16


 


OTHER GROUP THERAPY 94.44 13


 


RECREATIONAL THERAPY 93.81 12














Internal Medicine Assmt/Plan





- Assessment


Assessment: 





1.PARKINSON DISEASE.


2.DJD.


3.DEMENTIA.


4.PSYCHSIS.








- Plan


Plan: 





CONTINUE ON CURRENT MEDICATION AND DIET.





Nutritional Asmnt/Malnutr-PDOC





- Dietary Evaluation


Malnutrition Findings (Please click <Entered> for more info): 








Nutritional Asmnt/Malnutrition                             Start:  18 13:

53


Text:                                                      Status: Complete    

  


Freq:                                                                          

  


Protocol:                                                                      

  


 Document     18 13:53  GABRIELLEG  (Rec: 18 14:14  LYLY  BLAYNE-FNS1)


 Nutritional Asmnt/Malnutrition


     Patient General Information


      Nutritional Screening                      Moderate Risk


      Diagnosis                                  psychosis


      Pertinent Medical Hx/Surgical Hx           DM, dyslipidemia, PUD/GERD,


                                                 thyroid disorder, dementia,


                                                 alzheimer/s, parkinson/s,


                                                 polymyalgia rheumatica,


                                                 schizoaffective disorder,


                                                 neuropathy, psychosis


      Subjective Information                     Pt seen eating in dining room,


                                                 very confused with unclear


                                                 speech. Verified pt consumed


                                                 100% of lunch today. Per EMR,


                                                 PO intake mostly %.


      Current Diet Order/ Nutrition Support      CCHO 90gm, puree, large


                                                 portions, HPN three times a


                                                 day for supplement


      Pertinent Medications                      oscal w/vit D, colace, pepcid,


                                                 seroquel


      Pertinent Labs                              Alb 3.7, glucose 93


     Nutritional Hx/Data


      Height                                     1.83 m


      Height (Calculated Centimeters)            182.9


      Current Weight (lbs)                       62.596 kg


      Weight (Calculated Kilograms)              62.6


      Weight (Calculated Grams)                  28298.7


      Ideal Body Weight                          178


      Body Mass Index (BMI)                      18.7


      Weight Status                              Approriate


     GI Symptoms


      GI Symptoms                                None


      Last BM                                    11/12 x 2


      Difficult in:                              None


      Skin Integrity/Comment:                    intact


      Current %PO                                Good (%)


     Estimated Nutritional Goals


      BEE in Kcals:                              Using Current wt


      Calories/Kcals/Kg                          27-32


      Kcals Calculated                           0965-7643


      Protein:                                   Using Current wt


      Protein g/k-1.2


      Protein Calculated                         63-76


      Fluid: ml                                  170-ml (1ml/kcal)


     Nutritional Problem


      No current Nutrition Prob


       Problem                                   N/A


     Malnutrition Alert


      Is there a minimum of two criteria         No


       selected?                                 


       Query Text:Check all the applicable       


       criteria. A minimum of two criteria are   


       recommended for diagnosis of either       


       severe or non-severe malnutrition.        


     Malnutrition Related to Morbid Obesity


      Malnutrition related to morbid obesity     No


     Intervention/Recommendation


      Comments                                   1. Recommend regular diet for


                                                 liberalization d/t glucose WNL


                                                 and pt is not on diabetic


                                                 medication.


                                                 2. Monitor PO intake, wt, labs


                                                 and skin integrity


                                                 3. F/U as low risk in 7 days,


                                                 


     Expected Outcomes/Goals


      Expected Outcomes/Goals                    1. PO intake to meet at least


                                                 75% of nutritional needs.


                                                 2. Wt stability, skin to


                                                 remain intact, labs to


                                                 approach WNL.

## 2018-11-15 RX ADMIN — Medication SCH TAB: at 09:33

## 2018-11-15 RX ADMIN — OYSTER SHELL CALCIUM WITH VITAMIN D SCH TAB: 500; 200 TABLET, FILM COATED ORAL at 17:13

## 2018-11-15 RX ADMIN — ASPIRIN 81 MG SCH MG: 81 TABLET ORAL at 09:32

## 2018-11-15 NOTE — INTERNAL MEDICINE PROG NOTE
Internal Medicine Subjective





- Subjective


Service Date: 11/15/18


Patient seen and examined:: with staff


Patient is:: awake, non-verbal, in bed, confused


Per staff patient has:: no adverse event





Internal Medicine Objective





- Results


Result Diagrams: 


 18 18:18 18:


Recent Labs: 


 Laboratory Last Values











WBC  7.3 Th/cmm (4.8-10.8)   18  18:    


 


RBC  4.38 Mil/cmm (3.80-5.80)   18  18:    


 


Hgb  13.8 gm/dL (12-16)   18:    


 


Hct  41.0 % (41.0-60)   18:    


 


MCV  93.6 fl (80-99)   18:    


 


MCH  31.4 pg (27.0-31.0)  H  18:    


 


MCHC Differential  33.6 pg (28.0-36.0)   18:    


 


RDW  14.2 % (11.5-20.0)   18:    


 


Plt Count  234 Th/cmm (150-400)   18  18:    


 


MPV  7.2 fl  18:    


 


Neutrophils %  63.9 % (40.0-80.0)   18:    


 


Lymphocytes %  26.6 % (20.0-50.0)   18:    


 


Monocytes %  7.2 % (2.0-10.0)   18:    


 


Eosinophils %  1.6 % (0.0-5.0)   18:    


 


Basophils %  0.7 % (0.0-2.0)   18  18:    


 


Sodium  139 mEq/L (136-145)   18  18:    


 


Potassium  4.3 mEq/L (3.5-5.1)   18  18:    


 


Chloride  103 mEq/L ()   18:    


 


Carbon Dioxide  27.6 mEq/L (21.0-31.0)   18:    


 


Anion Gap  12.7  (7.0-16.0)   18  18:    


 


BUN  16 mg/dL (7-25)   18  18:    


 


Creatinine  0.7 mg/dL (0.7-1.3)   18  18:    


 


Est GFR ( Amer)  TNP   18  18:    


 


Est GFR (Non-Af Amer)  TNP   18  18:    


 


BUN/Creatinine Ratio  22.9   18  18:    


 


Glucose  93 mg/dL ()   18  18:    


 


Calcium  9.1 mg/dL (8.6-10.3)   18:    


 


Phosphorus  3.4 mg/dL (2.5-5.0)   18:    


 


Magnesium  2.2 mg/dL (1.9-2.7)   18:    


 


Total Bilirubin  0.3 mg/dL (0.3-1.0)   18:    


 


AST  12 U/L (13-39)  L  18:    


 


ALT  10 U/L (7-52)   18:    


 


Alkaline Phosphatase  69 U/L ()   18:    


 


Total Protein  7.0 gm/dL (6.0-8.3)   18:    


 


Albumin  3.7 gm/dL (4.2-5.5)  L  18:    


 


Globulin  3.3 gm/dL  18:    


 


Albumin/Globulin Ratio  1.1  (1.0-1.8)   18  18:    


 


Triglycerides  196 mg/dL (<150)  H  18  18:    


 


Cholesterol  218 mg/dL (<200)  H  18  18:    


 


LDL Cholesterol Direct  138 mg/dL ()   18:    


 


HDL Cholesterol  47 mg/dL (23-92)   18:    


 


TSH  1.81 uIU/ml (0.34-5.60)   18  18:    


 


Valproic Acid  14.1 ug/mL (50.0-100.0)  L  18  18:    














- Physical Exam


Vitals and I&O: 


 Vital Signs











Temp  98.4 F   11/15/18 19:56


 


Pulse  70   11/15/18 19:56


 


Resp  20   11/15/18 20:00


 


BP  110/65   11/15/18 19:56


 


Pulse Ox  96   11/15/18 19:56








 Intake & Output











 11/15/18 11/15/18 11/16/18





 06:59 18:59 06:59


 


Intake Total 120 1200 240


 


Balance 120 1200 240


 


Intake:   


 


  Oral 120 1200 240


 


Other:   


 


  # Voids 3  2


 


  # Bowel Movements  1 


 


  Stool Characteristics  Soft Soft











Active Medications: 


Current Medications





Acetaminophen (Tylenol)  650 mg PO Q4H PRN


   PRN Reason: Mild Pain / Temp above 100


   Stop: 19 21:52


Aspirin (Aspirin Chewable)  81 mg PO DAILY [facility]


   Stop: 19 08:59


   Last Admin: 11/15/18 09:32 Dose:  81 mg


Calcium/Vitamin D (Oscal W/Vitamin D)  1 tab PO QPM [facility]


   Stop: 19 16:59


   Last Admin: 11/15/18 17:13 Dose:  1 tab


Carbidopa/Levodopa (Sinemet 25mg-100 Mg)  2 tab PO TID [facility]


   Stop: 19 08:59


   Last Admin: 11/15/18 20:39 Dose:  2 tab


Docusate Sodium (Colace)  100 mg PO BID [facility]


   Stop: 19 08:59


   Last Admin: 11/15/18 17:12 Dose:  100 mg


Famotidine (Pepcid)  20 mg PO DAILY [facility]


   Stop: 19 08:59


   Last Admin: 11/15/18 09:33 Dose:  20 mg


Lorazepam (Ativan)  0.5 mg PO Q4H PRN; Protocol


   PRN Reason: Anxiety


   Stop: 19 21:48


   Last Admin: 11/15/18 15:19 Dose:  0.5 mg


Magnesium Hydroxide (Milk Of Magnesia)  30 ml PO HS PRN


   PRN Reason: Constipation


   Stop: 19 21:55


Quetiapine Fumarate (Seroquel)  50 mg PO BID [facility]; Protocol


   Stop: 01/15/19 08:59


Valproate Sodium (Depakene)  500 mg PO BID [facility]


   Stop: 19 08:59


   Last Admin: 11/15/18 17:12 Dose:  500 mg


Zolpidem Tartrate (Ambien)  5 mg PO HS PRN


   PRN Reason: Insomnia


   Stop: 19 21:48


   Last Admin: 11/15/18 20:40 Dose:  5 mg








General: demented


HEENT: NC/AT, PERRLA, EOMI, anicteric sclerae, throat clear


Neck: Supple, No JVD, No thyromegaly, +2 carotid pulse wo bruit, No LAD


Lungs: CTAB


Cardiovascular: RRR, Normal S1, Normal S2, without murmur


Abdomen: soft, non-tender, non-distended


Extremities: clear


Neurological: no change





- Procedures


Procedures: 


 Procedures











Procedure Code Date


 


CHANGE FEEDING DEVICE IN UP INTEST TRACT, EXTERN APPROACH [id number] 17


 


GROUP PSYCHOTHERAPY 82692 16


 


GROUP PSYCHOTHERAPY [id number] 16


 


OTHER GROUP THERAPY 94.44 13


 


RECREATIONAL THERAPY 93.81 12














Internal Medicine Assmt/Plan





- Assessment


Assessment: 





1.PARKINSON DISEASE.


2.DJD.


3.DEMENTIA.


4.PSYCHSIS.








- Plan


Plan: 





CONTINUE ON CURRENT MEDICATION AND DIET.





Nutritional Asmnt/Malnutr-PDOC





- Dietary Evaluation


Malnutrition Findings (Please click <Entered> for more info): 








Nutritional Asmnt/Malnutrition                             Start:  18 13:

53


Text:                                                      Status: Complete    

  


Freq:                                                                          

  


Protocol:                                                                      

  


 Document     18 13:53  LCHENG  (Rec: 18 14:14  LCHENG  BLAYNE-[email or web address])


 Nutritional Asmnt/Malnutrition


     Patient General Information


      Nutritional Screening                      Moderate Risk


      Diagnosis                                  psychosis


      Pertinent Medical Hx/Surgical Hx           DM, dyslipidemia, PUD/GERD,


                                                 thyroid disorder, dementia,


                                                 alzheimer/s, parkinson/s,


                                                 polymyalgia rheumatica,


                                                 schizoaffective disorder,


                                                 neuropathy, psychosis


      Subjective Information                     Pt seen eating in dining room,


                                                 very confused with unclear


                                                 speech. Verified pt consumed


                                                 100% of lunch today. Per EMR,


                                                 PO intake mostly %.


      Current Diet Order/ Nutrition Support      CCHO 90gm, puree, large


                                                 portions, HPN three times a


                                                 day for supplement


      Pertinent Medications                      oscal w/vit D, colace, pepcid,


                                                 seroquel


      Pertinent Labs                              Alb 3.7, glucose 93


     Nutritional Hx/Data


      Height                                     1.83 m


      Height (Calculated Centimeters)            182.9


      Current Weight (lbs)                       62.596 kg


      Weight (Calculated Kilograms)              62.6


      Weight (Calculated Grams)                  70227.7


      Ideal Body Weight                          178


      Body Mass Index (BMI)                      18.7


      Weight Status                              Approriate


     GI Symptoms


      GI Symptoms                                None


      Last BM                                    11/12 x 2


      Difficult in:                              None


      Skin Integrity/Comment:                    intact


      Current %PO                                Good (%)


     Estimated Nutritional Goals


      BEE in Kcals:                              Using Current wt


      Calories/Kcals/Kg                          27-32


      Kcals Calculated                           5023-3116


      Protein:                                   Using Current wt


      Protein g/k-1.2


      Protein Calculated                         63-76


      Fluid: ml                                  170-ml (1ml/kcal)


     Nutritional Problem


      No current Nutrition Prob


       Problem                                   N/A


     Malnutrition Alert


      Is there a minimum of two criteria         No


       selected?                                 


       Query Text:Check all the applicable       


       criteria. A minimum of two criteria are   


       recommended for diagnosis of either       


       severe or non-severe malnutrition.        


     Malnutrition Related to Morbid Obesity


      Malnutrition related to morbid obesity     No


     Intervention/Recommendation


      Comments                                   1. Recommend regular diet for


                                                 liberalization d/t glucose WNL


                                                 and pt is not on diabetic


                                                 medication.


                                                 2. Monitor PO intake, wt, labs


                                                 and skin integrity


                                                 3. F/U as low risk in 7 days,


                                                 


     Expected Outcomes/Goals


      Expected Outcomes/Goals                    1. PO intake to meet at least


                                                 75% of nutritional needs.


                                                 2. Wt stability, skin to


                                                 remain intact, labs to


                                                 approach WNL.

## 2018-11-15 NOTE — PROGRESS NOTES
DATE:  11/14/2018



SUBJECTIVE:  Staff was spoken to.  The patient is interviewed.  Mood is noted to

be irritable.  Affect is constricted.  The patient is talking nonstop.  The

patient's coping skills are noted to be very poor.  Continues to be very

paranoid.



ASSESSMENT:  The patient is still psychotic.



PLAN:  To increase the dose of the Seroquel to 50 mg and follow the patient with

the supportive therapy.





DD: 11/14/2018 18:37

DT: 11/15/2018 03:29

JOB# [id number]  [id number]

## 2018-11-16 RX ADMIN — ASPIRIN 81 MG SCH MG: 81 TABLET ORAL at 09:10

## 2018-11-16 RX ADMIN — OYSTER SHELL CALCIUM WITH VITAMIN D SCH: 500; 200 TABLET, FILM COATED ORAL at 17:20

## 2018-11-16 RX ADMIN — Medication SCH TAB: at 09:10

## 2018-11-16 NOTE — PROGRESS NOTES
DATE:  11/15/2018



SUBJECTIVE:  Staff was spoken to.  The patient is interviewed.  Mood is noted to

be irritable.  Affect is constricted.  The patient is keep on talking and even

when the dose of the medication has been increased is not making much sense. 

Coping skills at this time are noted to be still poor.  The patient is currently

on 500 mg of the Depakote and Seroquel is being given at 50 mg at bedtime.  Plan

to increase the dose of the Seroquel to 50 mg twice a day and follow the patient

with the supportive therapy.



ASSESSMENT:  The patient is still impulsive and psychotic.



PLAN:  To continue the patient with the above changes in the medication and

follow up with the supportive therapy.





DD: 11/15/2018 18:59

DT: 11/16/2018 04:44

JOB# [id number]  [id number]

## 2018-11-16 NOTE — INTERNAL MEDICINE PROG NOTE
Internal Medicine Subjective





- Subjective


Service Date: 18


Patient seen and examined:: with staff


Patient is:: awake, non-verbal, in bed, confused


Per staff patient has:: no adverse event





Internal Medicine Objective





- Results


Result Diagrams: 


 18 18:18 18:


Recent Labs: 


 Laboratory Last Values











WBC  7.3 Th/cmm (4.8-10.8)   18  18:    


 


RBC  4.38 Mil/cmm (3.80-5.80)   18  18:    


 


Hgb  13.8 gm/dL (12-16)   18:    


 


Hct  41.0 % (41.0-60)   18:    


 


MCV  93.6 fl (80-99)   18:    


 


MCH  31.4 pg (27.0-31.0)  H  18:    


 


MCHC Differential  33.6 pg (28.0-36.0)   18:    


 


RDW  14.2 % (11.5-20.0)   18:    


 


Plt Count  234 Th/cmm (150-400)   18  18:    


 


MPV  7.2 fl  18:    


 


Neutrophils %  63.9 % (40.0-80.0)   18:    


 


Lymphocytes %  26.6 % (20.0-50.0)   18:    


 


Monocytes %  7.2 % (2.0-10.0)   18:    


 


Eosinophils %  1.6 % (0.0-5.0)   18:    


 


Basophils %  0.7 % (0.0-2.0)   18  18:    


 


Sodium  139 mEq/L (136-145)   18  18:    


 


Potassium  4.3 mEq/L (3.5-5.1)   18  18:    


 


Chloride  103 mEq/L ()   18:    


 


Carbon Dioxide  27.6 mEq/L (21.0-31.0)   18:    


 


Anion Gap  12.7  (7.0-16.0)   18  18:    


 


BUN  16 mg/dL (7-25)   18  18:    


 


Creatinine  0.7 mg/dL (0.7-1.3)   18  18:    


 


Est GFR ( Amer)  TNP   18  18:    


 


Est GFR (Non-Af Amer)  TNP   18  18:    


 


BUN/Creatinine Ratio  22.9   18  18:    


 


Glucose  93 mg/dL ()   18  18:    


 


Calcium  9.1 mg/dL (8.6-10.3)   18:    


 


Phosphorus  3.4 mg/dL (2.5-5.0)   18:    


 


Magnesium  2.2 mg/dL (1.9-2.7)   18:    


 


Total Bilirubin  0.3 mg/dL (0.3-1.0)   18:    


 


AST  12 U/L (13-39)  L  18:    


 


ALT  10 U/L (7-52)   18:    


 


Alkaline Phosphatase  69 U/L ()   18:    


 


Total Protein  7.0 gm/dL (6.0-8.3)   18:    


 


Albumin  3.7 gm/dL (4.2-5.5)  L  18:    


 


Globulin  3.3 gm/dL  18:    


 


Albumin/Globulin Ratio  1.1  (1.0-1.8)   18  18:    


 


Triglycerides  196 mg/dL (<150)  H  18  18:    


 


Cholesterol  218 mg/dL (<200)  H  18  18:    


 


LDL Cholesterol Direct  138 mg/dL ()   18:    


 


HDL Cholesterol  47 mg/dL (23-92)   18:    


 


TSH  1.81 uIU/ml (0.34-5.60)   18  18:    


 


Valproic Acid  14.1 ug/mL (50.0-100.0)  L  18  18:    














- Physical Exam


Vitals and I&O: 


 Vital Signs











Temp  97.4 F   18 14:00


 


Pulse  89   18 14:00


 


Resp  19   18 14:00


 


BP  121/63   18 14:00


 


Pulse Ox  97   18 14:00








 Intake & Output











 11/15/18 11/16/18 11/16/18





 18:59 06:59 18:59


 


Intake Total 1200 240 


 


Balance 1200 240 


 


Intake:   


 


  Oral 1200 240 


 


Other:   


 


  # Voids  2 


 


  # Bowel Movements 1  


 


  Stool Characteristics Soft Soft 











Active Medications: 


Current Medications





Acetaminophen (Tylenol)  650 mg PO Q4H PRN


   PRN Reason: Mild Pain / Temp above 100


   Stop: 19 21:52


Aspirin (Aspirin Chewable)  81 mg PO DAILY [facility]


   Stop: 19 08:59


   Last Admin: 18 09:10 Dose:  81 mg


Calcium/Vitamin D (Oscal W/Vitamin D)  1 tab PO QPM [facility]


   Stop: 19 16:59


   Last Admin: 11/15/18 17:13 Dose:  1 tab


Carbidopa/Levodopa (Sinemet 25mg-100 Mg)  2 tab PO TID [facility]


   Stop: 19 08:59


   Last Admin: 18 09:10 Dose:  2 tab


Docusate Sodium (Colace)  100 mg PO BID [facility]


   Stop: 19 08:59


   Last Admin: 18 09:10 Dose:  100 mg


Famotidine (Pepcid)  20 mg PO DAILY [facility]


   Stop: 19 08:59


   Last Admin: 18 09:10 Dose:  20 mg


Lorazepam (Ativan)  0.5 mg PO Q4H PRN; Protocol


   PRN Reason: Anxiety


   Stop: 19 21:48


   Last Admin: 18 09:10 Dose:  0.5 mg


Magnesium Hydroxide (Milk Of Magnesia)  30 ml PO HS PRN


   PRN Reason: Constipation


   Stop: 19 21:55


Quetiapine Fumarate (Seroquel)  50 mg PO BID [facility]; Protocol


   Stop: 01/15/19 08:59


   Last Admin: 18 09:10 Dose:  50 mg


Valproate Sodium (Depakene)  500 mg PO BID [facility]


   Stop: 19 08:59


   Last Admin: 18 09:11 Dose:  500 mg


Zolpidem Tartrate (Ambien)  5 mg PO HS PRN


   PRN Reason: Insomnia


   Stop: 19 21:48


   Last Admin: 11/15/18 20:40 Dose:  5 mg








General: demented


HEENT: NC/AT, PERRLA, EOMI, anicteric sclerae, throat clear


Neck: Supple, No JVD, No thyromegaly, +2 carotid pulse wo bruit, No LAD


Lungs: CTAB


Cardiovascular: RRR, Normal S1, Normal S2, without murmur


Abdomen: soft, non-tender, non-distended


Extremities: clear


Neurological: no change





- Procedures


Procedures: 


 Procedures











Procedure Code Date


 


CHANGE FEEDING DEVICE IN UP INTEST TRACT, EXTERN APPROACH [id number] 17


 


GROUP PSYCHOTHERAPY 13525 16


 


GROUP PSYCHOTHERAPY GZHZZZZ 16


 


OTHER GROUP THERAPY 94.44 13


 


RECREATIONAL THERAPY 93.81 12














Internal Medicine Assmt/Plan





- Assessment


Assessment: 





1.PARKINSON DISEASE.


2.DJD.


3.DEMENTIA.


4.PSYCHSIS.








- Plan


Plan: 





CONTINUE ON CURRENT MEDICATION AND DIET.





Nutritional Asmnt/Malnutr-PDOC





- Dietary Evaluation


Malnutrition Findings (Please click <Entered> for more info): 








Nutritional Asmnt/Malnutrition                             Start:  18 13:

53


Text:                                                      Status: Complete    

  


Freq:                                                                          

  


Protocol:                                                                      

  


 Document     18 13:53  LCHENG  (Rec: 18 14:14  LCHENG  [email or web address]-[email or web address])


 Nutritional Asmnt/Malnutrition


     Patient General Information


      Nutritional Screening                      Moderate Risk


      Diagnosis                                  psychosis


      Pertinent Medical Hx/Surgical Hx           DM, dyslipidemia, PUD/GERD,


                                                 thyroid disorder, dementia,


                                                 alzheimer/s, parkinson/s,


                                                 polymyalgia rheumatica,


                                                 schizoaffective disorder,


                                                 neuropathy, psychosis


      Subjective Information                     Pt seen eating in dining room,


                                                 very confused with unclear


                                                 speech. Verified pt consumed


                                                 100% of lunch today. Per EMR,


                                                 PO intake mostly %.


      Current Diet Order/ Nutrition Support      CCHO 90gm, puree, large


                                                 portions, HPN three times a


                                                 day for supplement


      Pertinent Medications                      oscal w/vit D, colace, pepcid,


                                                 seroquel


      Pertinent Labs                              Alb 3.7, glucose 93


     Nutritional Hx/Data


      Height                                     1.83 m


      Height (Calculated Centimeters)            182.9


      Current Weight (lbs)                       62.596 kg


      Weight (Calculated Kilograms)              62.6


      Weight (Calculated Grams)                  58132.7


      Ideal Body Weight                          178


      Body Mass Index (BMI)                      18.7


      Weight Status                              Approriate


     GI Symptoms


      GI Symptoms                                None


      Last BM                                    11/12 x 2


      Difficult in:                              None


      Skin Integrity/Comment:                    intact


      Current %PO                                Good (%)


     Estimated Nutritional Goals


      BEE in Kcals:                              Using Current wt


      Calories/Kcals/Kg                          27-32


      Kcals Calculated                           3328-6362


      Protein:                                   Using Current wt


      Protein g/k-1.2


      Protein Calculated                         63-76


      Fluid: ml                                  -ml (1ml/kcal)


     Nutritional Problem


      No current Nutrition Prob


       Problem                                   N/A


     Malnutrition Alert


      Is there a minimum of two criteria         No


       selected?                                 


       Query Text:Check all the applicable       


       criteria. A minimum of two criteria are   


       recommended for diagnosis of either       


       severe or non-severe malnutrition.        


     Malnutrition Related to Morbid Obesity


      Malnutrition related to morbid obesity     No


     Intervention/Recommendation


      Comments                                   1. Recommend regular diet for


                                                 liberalization d/t glucose WNL


                                                 and pt is not on diabetic


                                                 medication.


                                                 2. Monitor PO intake, wt, labs


                                                 and skin integrity


                                                 3. F/U as low risk in 7 days,


                                                 


     Expected Outcomes/Goals


      Expected Outcomes/Goals                    1. PO intake to meet at least


                                                 75% of nutritional needs.


                                                 2. Wt stability, skin to


                                                 remain intact, labs to


                                                 approach WNL.

## 2018-11-17 RX ADMIN — OYSTER SHELL CALCIUM WITH VITAMIN D SCH TAB: 500; 200 TABLET, FILM COATED ORAL at 17:05

## 2018-11-17 RX ADMIN — Medication SCH TAB: at 09:40

## 2018-11-17 RX ADMIN — ASPIRIN 81 MG SCH MG: 81 TABLET ORAL at 09:40

## 2018-11-17 NOTE — PROGRESS NOTES
DATE:  11/16/2018



PSYCHIATRIC PROGRESS NOTE



SUBJECTIVE:  Staff was spoken to.  The patient is interviewed.  Mood is noted to

be irritable.  Affect is constricted.  Coping skills are noted to be still poor.

 Insight and judgment are noted to be limited.  The patient has been having

difficult time to cope with stress.  No side effects to the medications are

noted.  The patient has been screaming and yelling when he does not get his way.



ASSESSMENT:  The patient is still impulsive.



PLAN:  Continue the patient with supportive therapy and follow up.





DD: 11/16/2018 18:30

DT: 11/17/2018 03:57

[facility]# [id number]  [id number]

## 2018-11-17 NOTE — INTERNAL MEDICINE PROG NOTE
Internal Medicine Subjective





- Subjective


Service Date: 18


Patient seen and examined:: with staff


Patient is:: awake, non-verbal, in bed, confused


Per staff patient has:: no adverse event





Internal Medicine Objective





- Results


Result Diagrams: 


 18 18:18 18:


Recent Labs: 


 Laboratory Last Values











WBC  7.3 Th/cmm (4.8-10.8)   18:    


 


RBC  4.38 Mil/cmm (3.80-5.80)   18  18:    


 


Hgb  13.8 gm/dL (12-16)   18:    


 


Hct  41.0 % (41.0-60)   18:    


 


MCV  93.6 fl (80-99)   18:    


 


MCH  31.4 pg (27.0-31.0)  H  18:    


 


MCHC Differential  33.6 pg (28.0-36.0)   18:    


 


RDW  14.2 % (11.5-20.0)   18:    


 


Plt Count  234 Th/cmm (150-400)   18:    


 


MPV  7.2 fl  18:    


 


Neutrophils %  63.9 % (40.0-80.0)   18:    


 


Lymphocytes %  26.6 % (20.0-50.0)   18:    


 


Monocytes %  7.2 % (2.0-10.0)   18:    


 


Eosinophils %  1.6 % (0.0-5.0)   18:    


 


Basophils %  0.7 % (0.0-2.0)   18  18:    


 


Sodium  139 mEq/L (136-145)   18  18:    


 


Potassium  4.3 mEq/L (3.5-5.1)   18:    


 


Chloride  103 mEq/L ()   18:    


 


Carbon Dioxide  27.6 mEq/L (21.0-31.0)   18:    


 


Anion Gap  12.7  (7.0-16.0)   18  18:    


 


BUN  16 mg/dL (7-25)   18  18:    


 


Creatinine  0.7 mg/dL (0.7-1.3)   18  18:    


 


Est GFR ( Amer)  TNP   18  18:    


 


Est GFR (Non-Af Amer)  TNP   18  18:    


 


BUN/Creatinine Ratio  22.9   18  18:    


 


Glucose  93 mg/dL ()   18  18:    


 


Calcium  9.1 mg/dL (8.6-10.3)   18:    


 


Phosphorus  3.4 mg/dL (2.5-5.0)   18:    


 


Magnesium  2.2 mg/dL (1.9-2.7)   18:    


 


Total Bilirubin  0.3 mg/dL (0.3-1.0)   18:    


 


AST  12 U/L (13-39)  L  18:    


 


ALT  10 U/L (7-52)   18:    


 


Alkaline Phosphatase  69 U/L ()   18:    


 


Total Protein  7.0 gm/dL (6.0-8.3)   18:    


 


Albumin  3.7 gm/dL (4.2-5.5)  L  18:    


 


Globulin  3.3 gm/dL  18:    


 


Albumin/Globulin Ratio  1.1  (1.0-1.8)   18  18:    


 


Triglycerides  196 mg/dL (<150)  H  18  18:    


 


Cholesterol  218 mg/dL (<200)  H  18  18:    


 


LDL Cholesterol Direct  138 mg/dL ()   18:    


 


HDL Cholesterol  47 mg/dL (23-92)   18:    


 


TSH  1.81 uIU/ml (0.34-5.60)   18  18:    


 


Valproic Acid  14.1 ug/mL (50.0-100.0)  L  18  18:    














- Physical Exam


Vitals and I&O: 


 Vital Signs











Temp  97 F   18 14:00


 


Pulse  72   18 14:00


 


Resp  18   18 14:00


 


BP  106/60   18 14:00


 


Pulse Ox  94   18 14:00








 Intake & Output











 18





 18:59 06:59 18:59


 


Intake Total 1200 120 800


 


Balance 1200 120 800


 


Intake:   


 


  Oral 1200 120 800


 


Other:   


 


  # Voids  3 4


 


  # Bowel Movements  0 0


 


  Stool Characteristics Soft  











Active Medications: 


Current Medications





Acetaminophen (Tylenol)  650 mg PO Q4H PRN


   PRN Reason: Mild Pain / Temp above 100


   Stop: 19 21:52


Aspirin (Aspirin Chewable)  81 mg PO DAILY [facility]


   Stop: 19 08:59


   Last Admin: 18 09:40 Dose:  81 mg


Calcium/Vitamin D (Oscal W/Vitamin D)  1 tab PO QPM [facility]


   Stop: 19 16:59


   Last Admin: 18 17:05 Dose:  1 tab


Carbidopa/Levodopa (Sinemet 25mg-100 Mg)  2 tab PO TID [facility]


   Stop: 19 08:59


   Last Admin: 18 14:25 Dose:  2 tab


Docusate Sodium (Colace)  100 mg PO BID [facility]


   Stop: 19 08:59


   Last Admin: 18 17:05 Dose:  100 mg


Famotidine (Pepcid)  20 mg PO DAILY [facility]


   Stop: 19 08:59


   Last Admin: 18 09:40 Dose:  20 mg


Magnesium Hydroxide (Milk Of Magnesia)  30 ml PO HS PRN


   PRN Reason: Constipation


   Stop: 19 21:55


Quetiapine Fumarate (Seroquel)  50 mg PO BID [facility]; Protocol


   Stop: 01/15/19 08:59


   Last Admin: 18 17:06 Dose:  50 mg


Valproate Sodium (Depakene)  500 mg PO BID [facility]


   Stop: 19 08:59


   Last Admin: 18 17:06 Dose:  500 mg








General: demented


HEENT: NC/AT, PERRLA, EOMI, anicteric sclerae, throat clear


Neck: Supple, No JVD, No thyromegaly, +2 carotid pulse wo bruit, No LAD


Lungs: CTAB


Cardiovascular: RRR, Normal S1, Normal S2, without murmur


Abdomen: soft, non-tender, non-distended


Extremities: clear


Neurological: no change





- Procedures


Procedures: 


 Procedures











Procedure Code Date


 


CHANGE FEEDING DEVICE IN UP INTEST TRACT, EXTERN APPROACH [id number] 17


 


GROUP PSYCHOTHERAPY [id number] 16


 


GROUP PSYCHOTHERAPY [id number] 16


 


OTHER GROUP THERAPY 94.44 13


 


RECREATIONAL THERAPY 93.81 12














Internal Medicine Assmt/Plan





- Assessment


Assessment: 





1.PARKINSON DISEASE.


2.DJD.


3.DEMENTIA.


4.PSYCHSIS.








- Plan


Plan: 





CONTINUE ON CURRENT MEDICATION AND DIET.





Nutritional Asmnt/Malnutr-PDOC





- Dietary Evaluation


Malnutrition Findings (Please click <Entered> for more info): 








Nutritional Asmnt/Malnutrition                             Start:  18 13:

53


Text:                                                      Status: Complete    

  


Freq:                                                                          

  


Protocol:                                                                      

  


 Document     18 13:53  GABRIELLEG  (Rec: 18 14:14  LYLY  BLAYNE-FNS1)


 Nutritional Asmnt/Malnutrition


     Patient General Information


      Nutritional Screening                      Moderate Risk


      Diagnosis                                  psychosis


      Pertinent Medical Hx/Surgical Hx           DM, dyslipidemia, PUD/GERD,


                                                 thyroid disorder, dementia,


                                                 alzheimer/s, parkinson/s,


                                                 polymyalgia rheumatica,


                                                 schizoaffective disorder,


                                                 neuropathy, psychosis


      Subjective Information                     Pt seen eating in dining room,


                                                 very confused with unclear


                                                 speech. Verified pt consumed


                                                 100% of lunch today. Per EMR,


                                                 PO intake mostly %.


      Current Diet Order/ Nutrition Support      CCHO 90gm, puree, large


                                                 portions, HPN three times a


                                                 day for supplement


      Pertinent Medications                      oscal w/vit D, colace, pepcid,


                                                 seroquel


      Pertinent Labs                              Alb 3.7, glucose 93


     Nutritional Hx/Data


      Height                                     1.83 m


      Height (Calculated Centimeters)            182.9


      Current Weight (lbs)                       62.596 kg


      Weight (Calculated Kilograms)              62.6


      Weight (Calculated Grams)                  81077.7


      Ideal Body Weight                          178


      Body Mass Index (BMI)                      18.7


      Weight Status                              Approriate


     GI Symptoms


      GI Symptoms                                None


      Last BM                                    11/12 x 2


      Difficult in:                              None


      Skin Integrity/Comment:                    intact


      Current %PO                                Good (%)


     Estimated Nutritional Goals


      BEE in Kcals:                              Using Current wt


      Calories/Kcals/Kg                          27-32


      Kcals Calculated                           8076-5736


      Protein:                                   Using Current wt


      Protein g/k-1.2


      Protein Calculated                         63-76


      Fluid: ml                                  170-ml (1ml/kcal)


     Nutritional Problem


      No current Nutrition Prob


       Problem                                   N/A


     Malnutrition Alert


      Is there a minimum of two criteria         No


       selected?                                 


       Query Text:Check all the applicable       


       criteria. A minimum of two criteria are   


       recommended for diagnosis of either       


       severe or non-severe malnutrition.        


     Malnutrition Related to Morbid Obesity


      Malnutrition related to morbid obesity     No


     Intervention/Recommendation


      Comments                                   1. Recommend regular diet for


                                                 liberalization d/t glucose WNL


                                                 and pt is not on diabetic


                                                 medication.


                                                 2. Monitor PO intake, wt, labs


                                                 and skin integrity


                                                 3. F/U as low risk in 7 days,


                                                 


     Expected Outcomes/Goals


      Expected Outcomes/Goals                    1. PO intake to meet at least


                                                 75% of nutritional needs.


                                                 2. Wt stability, skin to


                                                 remain intact, labs to


                                                 approach WNL.

## 2018-11-18 RX ADMIN — OYSTER SHELL CALCIUM WITH VITAMIN D SCH TAB: 500; 200 TABLET, FILM COATED ORAL at 17:44

## 2018-11-18 RX ADMIN — ASPIRIN 81 MG SCH MG: 81 TABLET ORAL at 08:36

## 2018-11-18 RX ADMIN — Medication SCH TAB: at 08:36

## 2018-11-18 NOTE — PROGRESS NOTES
DATE:  11/18/2018



SUBJECTIVE:  Staff was spoken to.  The patient is interviewed.  Mood is noted to

be less irritable.  The patient's mood swings are coming under control.  Insight

and judgment noted to be improving.  No side effects to the medications are

noted.  Sleep and appetite are noted to be fair.  The patient has been

participating in the groups.



ASSESSMENT:  The patient's mood swings are coming under control.



PLAN:  To continue the patient with the supportive therapy and followup.





DD: 11/18/2018 12:00

DT: 11/18/2018 13:10

[facility]# [id number]  [id number]

## 2018-11-18 NOTE — INTERNAL MEDICINE PROG NOTE
Internal Medicine Subjective





- Subjective


Service Date: 18


Patient seen and examined:: with staff


Patient is:: awake, non-verbal, in bed, confused


Per staff patient has:: no adverse event





Internal Medicine Objective





- Results


Result Diagrams: 


 18 18:18 18:


Recent Labs: 


 Laboratory Last Values











WBC  7.3 Th/cmm (4.8-10.8)   18:    


 


RBC  4.38 Mil/cmm (3.80-5.80)   18  18:    


 


Hgb  13.8 gm/dL (12-16)   18:    


 


Hct  41.0 % (41.0-60)   18:    


 


MCV  93.6 fl (80-99)   18:    


 


MCH  31.4 pg (27.0-31.0)  H  18:    


 


MCHC Differential  33.6 pg (28.0-36.0)   18:    


 


RDW  14.2 % (11.5-20.0)   18:    


 


Plt Count  234 Th/cmm (150-400)   18:    


 


MPV  7.2 fl  18:    


 


Neutrophils %  63.9 % (40.0-80.0)   18:    


 


Lymphocytes %  26.6 % (20.0-50.0)   18:    


 


Monocytes %  7.2 % (2.0-10.0)   18:    


 


Eosinophils %  1.6 % (0.0-5.0)   18:    


 


Basophils %  0.7 % (0.0-2.0)   18  18:    


 


Sodium  139 mEq/L (136-145)   18:    


 


Potassium  4.3 mEq/L (3.5-5.1)   18:    


 


Chloride  103 mEq/L ()   18:    


 


Carbon Dioxide  27.6 mEq/L (21.0-31.0)   18:    


 


Anion Gap  12.7  (7.0-16.0)   18  18:    


 


BUN  16 mg/dL (7-25)   18  18:    


 


Creatinine  0.7 mg/dL (0.7-1.3)   18  18:    


 


Est GFR ( Amer)  TNP   18  18:    


 


Est GFR (Non-Af Amer)  TNP   18  18:    


 


BUN/Creatinine Ratio  22.9   18  18:    


 


Glucose  93 mg/dL ()   18  18:    


 


Calcium  9.1 mg/dL (8.6-10.3)   18:    


 


Phosphorus  3.4 mg/dL (2.5-5.0)   18:    


 


Magnesium  2.2 mg/dL (1.9-2.7)   18:    


 


Total Bilirubin  0.3 mg/dL (0.3-1.0)   18:    


 


AST  12 U/L (13-39)  L  18:    


 


ALT  10 U/L (7-52)   18:    


 


Alkaline Phosphatase  69 U/L ()   18:    


 


Total Protein  7.0 gm/dL (6.0-8.3)   18:    


 


Albumin  3.7 gm/dL (4.2-5.5)  L  18:    


 


Globulin  3.3 gm/dL  18:    


 


Albumin/Globulin Ratio  1.1  (1.0-1.8)   18  18:    


 


Triglycerides  196 mg/dL (<150)  H  18  18:    


 


Cholesterol  218 mg/dL (<200)  H  18  18:    


 


LDL Cholesterol Direct  138 mg/dL ()   18:    


 


HDL Cholesterol  47 mg/dL (23-92)   18:    


 


TSH  1.81 uIU/ml (0.34-5.60)   18  18:    


 


Valproic Acid  14.1 ug/mL (50.0-100.0)  L  18  18:    














- Physical Exam


Vitals and I&O: 


 Vital Signs











Temp  97 F   18 14:00


 


Pulse  72   18 14:00


 


Resp  20   18 07:43


 


BP  106/60   18 14:00


 


Pulse Ox  94   18 14:00








 Intake & Output











 18





 18:59 06:59 18:59


 


Intake Total 800  


 


Balance 800  


 


Intake:   


 


  Oral 800  


 


Other:   


 


  # Voids 4  


 


  # Bowel Movements 0  











Active Medications: 


Current Medications





Acetaminophen (Tylenol)  650 mg PO Q4H PRN


   PRN Reason: Mild Pain / Temp above 100


   Stop: 19 21:52


Aspirin (Aspirin Chewable)  81 mg PO DAILY [facility]


   Stop: 19 08:59


   Last Admin: 18 08:36 Dose:  81 mg


Calcium/Vitamin D (Oscal W/Vitamin D)  1 tab PO QPM [facility]


   Stop: 19 16:59


   Last Admin: 18 17:44 Dose:  1 tab


Carbidopa/Levodopa (Sinemet 25mg-100 Mg)  2 tab PO TID [facility]


   Stop: 19 08:59


   Last Admin: 18 13:51 Dose:  2 tab


Docusate Sodium (Colace)  100 mg PO BID [facility]


   Stop: 19 08:59


   Last Admin: 18 16:33 Dose:  100 mg


Famotidine (Pepcid)  20 mg PO DAILY [facility]


   Stop: 19 08:59


   Last Admin: 18 08:36 Dose:  20 mg


Magnesium Hydroxide (Milk Of Magnesia)  30 ml PO HS PRN


   PRN Reason: Constipation


   Stop: 19 21:55


Quetiapine Fumarate (Seroquel)  50 mg PO BID [facility]; Protocol


   Stop: 01/15/19 08:59


   Last Admin: 18 16:33 Dose:  50 mg


Valproate Sodium (Depakene)  500 mg PO BID [facility]


   Stop: 19 08:59


   Last Admin: 18 16:33 Dose:  500 mg








General: demented


HEENT: NC/AT, PERRLA, EOMI, anicteric sclerae, throat clear


Neck: Supple, No JVD, No thyromegaly, +2 carotid pulse wo bruit, No LAD


Lungs: CTAB


Cardiovascular: RRR, Normal S1, Normal S2, without murmur


Abdomen: soft, non-tender, non-distended


Extremities: clear


Neurological: no change





- Procedures


Procedures: 


 Procedures











Procedure Code Date


 


CHANGE FEEDING DEVICE IN UP INTEST TRACT, EXTERN APPROACH [id number] 17


 


GROUP PSYCHOTHERAPY 64163 16


 


GROUP PSYCHOTHERAPY [id number] 16


 


OTHER GROUP THERAPY 94.44 13


 


RECREATIONAL THERAPY 93.81 12














Internal Medicine Assmt/Plan





- Assessment


Assessment: 





1.PARKINSON DISEASE.


2.DJD.


3.DEMENTIA.


4.PSYCHSIS.








- Plan


Plan: 





CONTINUE ON CURRENT MEDICATION AND DIET.





Nutritional Asmnt/Malnutr-PDOC





- Dietary Evaluation


Malnutrition Findings (Please click <Entered> for more info): 








Nutritional Asmnt/Malnutrition                             Start:  18 13:

53


Text:                                                      Status: Complete    

  


Freq:                                                                          

  


Protocol:                                                                      

  


 Document     18 13:53  [email or web address]  (Rec: 18 14:14  [id number]  [email or web address])


 Nutritional Asmnt/Malnutrition


     Patient General Information


      Nutritional Screening                      Moderate Risk


      Diagnosis                                  psychosis


      Pertinent Medical Hx/Surgical Hx           DM, dyslipidemia, PUD/GERD,


                                                 thyroid disorder, dementia,


                                                 alzheimer/s, parkinson/s,


                                                 polymyalgia rheumatica,


                                                 schizoaffective disorder,


                                                 neuropathy, psychosis


      Subjective Information                     Pt seen eating in dining room,


                                                 very confused with unclear


                                                 speech. Verified pt consumed


                                                 100% of lunch today. Per EMR,


                                                 PO intake mostly %.


      Current Diet Order/ Nutrition Support      CCHO 90gm, puree, large


                                                 portions, HPN three times a


                                                 day for supplement


      Pertinent Medications                      oscal w/vit D, colace, pepcid,


                                                 seroquel


      Pertinent Labs                              Alb 3.7, glucose 93


     Nutritional Hx/Data


      Height                                     1.83 m


      Height (Calculated Centimeters)            182.9


      Current Weight (lbs)                       62.596 kg


      Weight (Calculated Kilograms)              62.6


      Weight (Calculated Grams)                  32371.7


      Ideal Body Weight                          178


      Body Mass Index (BMI)                      18.7


      Weight Status                              Approriate


     GI Symptoms


      GI Symptoms                                None


      Last BM                                    11/12 x 2


      Difficult in:                              None


      Skin Integrity/Comment:                    intact


      Current %PO                                Good (%)


     Estimated Nutritional Goals


      BEE in Kcals:                              Using Current wt


      Calories/Kcals/Kg                          27-32


      Kcals Calculated                           


      Protein:                                   Using Current wt


      Protein g/k-1.2


      Protein Calculated                         63-76


      Fluid: ml                                  170-2016ml (1ml/kcal)


     Nutritional Problem


      No current Nutrition Prob


       Problem                                   N/A


     Malnutrition Alert


      Is there a minimum of two criteria         No


       selected?                                 


       Query Text:Check all the applicable       


       criteria. A minimum of two criteria are   


       recommended for diagnosis of either       


       severe or non-severe malnutrition.        


     Malnutrition Related to Morbid Obesity


      Malnutrition related to morbid obesity     No


     Intervention/Recommendation


      Comments                                   1. Recommend regular diet for


                                                 liberalization d/t glucose WNL


                                                 and pt is not on diabetic


                                                 medication.


                                                 2. Monitor PO intake, wt, labs


                                                 and skin integrity


                                                 3. F/U as low risk in 7 days,


                                                 


     Expected Outcomes/Goals


      Expected Outcomes/Goals                    1. PO intake to meet at least


                                                 75% of nutritional needs.


                                                 2. Wt stability, skin to


                                                 remain intact, labs to


                                                 approach WNL.

## 2018-11-19 RX ADMIN — Medication SCH TAB: at 08:44

## 2018-11-19 RX ADMIN — ASPIRIN 81 MG SCH MG: 81 TABLET ORAL at 08:44

## 2018-11-19 NOTE — DISCHARGE SUMMARY
DATE OF DISCHARGE:  11/18/2018



PSYCHIATRIC DISCHARGE SUMMARY



IDENTIFYING DATA:  The patient is a 73-year-old  male, a resident of

[facility].



JUSTIFICATION OF HOSPITALIZATION:  The patient is admitted on a voluntary basis

in view of his agitation and aggressive behavior.



CHIEF COMPLAINT:  "I should not be here."



DIAGNOSES AT THE TIME OF ADMISSION:

AXIS I:  Bipolar disorder, mixed with psychotic symptoms.

AXIS II:  None.

AXIS III:  As per Dr. Bridges.



HISTORY OF PRESENT ILLNESS:  Please refer the 11/10/2018 dictation done by me.



PHYSICAL EXAMINATION:  Done by Dr. Bridges and is noted to be significant for

Parkinson's disease and degenerative joint disease.



HOSPITAL COURSE AND RESPONSE TO TREATMENT:  The patient has been closely

monitored.  The blood work done at the time of the hospitalization had been

reviewed and no major intervention was needed.  Cholesterol is noted to be 218,

triglycerides 196, and valproic acid level is noted to be 14.1.  The patient has

been closely monitored.  Encouraged to participate in groups and verbalize the

concerns.  The patient has been placed on Seroquel which was gradually increased

to 50 mg twice a day, valproic acid 500 mg twice a day.  With these medications,

the patient was noted to be doing fairly well and the patient was finally

discharged on 11/19/2018 with the recommendation that he is going to be seeking

treatment at [facility].





DD: 11/19/2018 09:43

DT: 11/19/2018 10:00

[facility]# [id number]  [id number]

## 2022-04-16 NOTE — ED PHYSICIAN CHART
ED Chief Complaint/HPI





- Patient Information


Date Seen:: 11/09/18


Time Seen:: 17:42


Chief Complaint:: agitation, hitting staff


History of Present Illness:: 





agitation, hitting staff


Allergies:: 


 Allergies











Allergy/AdvReac Type Severity Reaction Status Date / Time


 


Sulfa (Sulfonamide Allergy   Verified 11/09/18 17:29





Antibiotics)     











Vitals:: 


 Vital Signs - 8 hr











  11/09/18





  17:42


 


Temp 99.1 F


 


HR 95


 


RR 18


 


/86


 


O2 Sat % 99











Historian:: Medical Records


Review:: Nurse's Note Reviewed, Transfer documents Reviewed





ED Review of Systems





- Review of Systems


General/Constitutional: No fever, No chills, No weight loss, No weakness, No 

diaphoresis, No edema, No loss of appetite


Skin: No skin lesions, No rash, No bruising


Head: No headache, No light-headedness


Eyes: No loss of vision, No pain, No diplopia


ENT: No earache, No nasal drainage, No sore throat, No tinnitus


Neck: No neck pain, No swelling, No thyromegaly, No stiffness, No mass noted


Cardio Vascular: No chest pain, No palpitations, No PND, No orthopnea, No edema


Pulmonary: No SOB, No cough, No sputum, No wheezing


GI: No nausea, No vomiting, No diarrhea, No pain, No melena, No hematochezia, 

No constipation, No hematemesis


G/U: No dysuria, No frequency, No hematuria


Musculoskeletal: No bone or joint pain, No back pain, No muscle pain


Endocrine: No polyuria, No polydipsia


Psychiatric: Prior psych history, Other (agitation, hitting staff)


Hematopoietic: No bruising, No lymphadenopathy


Allergic/Immuno: No urticaria, No angioedema


Neurological: No syncope, No focal symptoms, No weakness, No paresthesia, No 

headache, No seizure, No dizziness, No confusion, No vertigo





ED Past Medical History





- Past Medical History


Obtainable: No


Past Medical History: DM, Dyslipidemia, PUD/GERD, Thyroid disorder, Dementia, 

Other (Alzheimer's; Parkinson's; polymyalgia rheumatica)


Psychiatricy History: Dementia, Other (Alzheimer's; schizoaffective disorder; 

neuropathy; psychosis)





Family Medical History





- Family Member


  ** Father


History Unknown: Yes


Ethnicity: Unknown


Living Status: Unknown


Hx Family Cancer: No


Hx Family Coronary Artery Disease: No


Hx Family Congestive Heart Failure: No


Hx Family Hypertension: No


Hx Family Stroke: No


Hx Family Diabetes: No


Hx Family Seizures: No


Hx Family Dementia: No


Hx Family AIDS: No


Hx Family HIV: No


Hx Family COPD: No


Hx Family Hepatitis: No


Hx Family Psychiatric Problems: No


Hx Family Tuberculosis: No





ED Physical Exam





- Physical Examination


General/Constitutional: Awake, Well-developed, well-nourished, Alert, No 

distress, GCS 15, Non-toxic appearing, Ambulatory


Head: Atraumatic


Eyes: Lids, conjuctiva normal, PERRL, EOMI


Skin: Nl inspection, No rash, No skin lesions, No ecchymosis, Well hydrated, No 

lymphadenopathy


ENMT: External ears, nose nl, Nasal exam nl, Lips, teeth, gums nl


Neck: Nontender, Full ROM w/o pain, No JVD, No nuchal rigidity, No bruit, No 

mass, No stridor


Respiratory: Nl effort/Exclusion, Clear to Auscultation, No Wheeze/Rhonchi/Rales


Cardio Vascular: RRR, No murmur, gallop, rubs, NL S1 S2


GI: No tenderness/rebounding/guarding, No organomegaly, No hernia, Normal BS's, 

Nondistended, No mass/bruits, No McBurney tenderness


: No CVA tenderness


Extremities: No tenderness or effusion, Full ROM, normal strength in all 

extremities, No edema, Normal digits & nails


Neuro/Psych: Alert/oriented, Normal sensory exam, Normal motor strength, 

Judgement/insight normal, Mood normal, Normal gait, No focal deficits


Misc: Normal back, No paraspinal tenderness





ED Labs/Radiology/EKG Results





- Lab Results


Results: 


 Laboratory Tests











  11/09/18 11/09/18 11/09/18





  18:27 18:27 18:27


 


WBC  7.3  


 


RBC  4.38  


 


Hgb  13.8  


 


Hct  41.0  


 


MCV  93.6  


 


MCH  31.4 H  


 


MCHC Differential  33.6  


 


RDW  14.2  


 


Plt Count  234  


 


MPV  7.2  


 


Neutrophils %  63.9  


 


Lymphocytes %  26.6  


 


Monocytes %  7.2  


 


Eosinophils %  1.6  


 


Basophils %  0.7  


 


Sodium   139 


 


Potassium   4.3 


 


Chloride   103 


 


Carbon Dioxide   27.6 


 


Anion Gap   12.7 


 


BUN   16 


 


Creatinine   0.7 


 


Est GFR ( Amer)   TNP 


 


Est GFR (Non-Af Amer)   TNP 


 


BUN/Creatinine Ratio   22.9 


 


Glucose   93 


 


Calcium   9.1 


 


Phosphorus   3.4 


 


Magnesium   2.2 


 


Total Bilirubin   0.3 


 


AST   12 L 


 


ALT   10 


 


Alkaline Phosphatase   69 


 


Total Protein   7.0 


 


Albumin   3.7 L 


 


Globulin   3.3 


 


Albumin/Globulin Ratio   1.1 


 


TSH    1.81


 


Valproic Acid   














  11/09/18





  18:27


 


WBC 


 


RBC 


 


Hgb 


 


Hct 


 


MCV 


 


MCH 


 


MCHC Differential 


 


RDW 


 


Plt Count 


 


MPV 


 


Neutrophils % 


 


Lymphocytes % 


 


Monocytes % 


 


Eosinophils % 


 


Basophils % 


 


Sodium 


 


Potassium 


 


Chloride 


 


Carbon Dioxide 


 


Anion Gap 


 


BUN 


 


Creatinine 


 


Est GFR ( Amer) 


 


Est GFR (Non-Af Amer) 


 


BUN/Creatinine Ratio 


 


Glucose 


 


Calcium 


 


Phosphorus 


 


Magnesium 


 


Total Bilirubin 


 


AST 


 


ALT 


 


Alkaline Phosphatase 


 


Total Protein 


 


Albumin 


 


Globulin 


 


Albumin/Globulin Ratio 


 


TSH 


 


Valproic Acid  14.1 L














ED Assessment





- Assessment


General Assessment: 





patient is resting comfortably.





HE IS STABLE FROM A MEDICAL STANDPOINT FOR THE GEROPSCYH UNIT EXCEPT FOR TWO 

THINGS:  HIS VALPROIC ACID LEVEL IS TOO LOW AND HE HAS NOT YET PROVIDED US WITH 

A URINE SPECIMEN.





ED Septic Shock





- .


Is Septic Shock (SBP<90, OR Lactate>4 mmol\L) present?: No





- <6hrs of presentation:


Vital Signs: 


 Vital Signs - 8 hr











  11/09/18





  17:42


 


Temp 99.1 F


 


HR 95


 


RR 18


 


/86


 


O2 Sat % 99














ED Reassessment (Disposition)





- Reassessment


Reassessment Condition:: Unchanged





- Diagnosis


Diagnosis:: 


Increased agitation, hitting staff.





Low valproic acid level.





Alzheimer's 





Dementia





Hyperthyroidism,





Diabetes mellitus





Dysphagia











- Patient Disposition


Discharge/Transfer:: Acute Care w/in this hosp


Admitted to:: [facility]


Condition at Disposition:: Stable, Unchanged IMPROVE-DD Application Not Available

## 2024-03-05 NOTE — NUR
70 YO MALE BIB EMS FROM [facility] FOR G TUBE REPLACEMENT. HX OF 
EMENTIA& SCHIZOPHRENIA. PT ACTING NEUROLOGICALLY AT BASE LINE.  DENIES N/V/D; 
SKIN IS PINK/WARM/DRY; AAOX4 WITH EVEN AND STEADY GAIT; LUNGS CLEAR BL; HR EVEN 
AND REGULAR; PT DENIES ANY FEVER, CP, SOB, OR COUGH AT THIS TIME; PATIENT 
STATES PAIN OF 0/10 AT THIS TIME; VSS; PATIENT POSITIONED FOR COMFORT; HOB 
ELEVATED; BEDRAILS UP X2; BED DOWN. ER MD MADE AWARE OF PT STATUS.
7/17/17 

RD INITIAL ASSESSMENT COMPLETED



PLEASE REFER TO NUTRITION ASSESSMENT UNDER CARE ACTIVITY FOR ESTIMATED NUTRITIONAL NEEDS. 



1. CONTINUE CURRENT TF - FIBERSOURCE HN AT GOAL RATE OF 60 ML/HR. IF NEEDED, 140 ML WATER 
FLUSH Q6H (PROVIDES 560 ML FREE WATER TOTAL)

2. RD TO FOLLOW-UP 2-3 DAYS; HIGH RISK



OMID HERNANDEZ, SANDY
ATTEMPTED TO DO EKG PT STILL REMAINS IN ED AT THIS TIME.
CALLED [facility] AND SPOKE TO LINH TO CLARIFY NEXT DOSE OF DEPACON IVPB, A 
DOSE WAS GIVEN AT 1747 AND THE NEXT SCHEDULE DOSE AT 2100, SAID TO HOLD THE 2100 DOSE 
BECAUSE IT'S TOO CLOSE TO LAST DOSE, ADRIEN SANCHEZ MADE AWARE.
ECHO DONE
EXCHANGE WAS CALLED MD LAM ON CALL FOR LISBETH ASH WILL WAIT FOR HIS CALL BACK.
EXPLAINED EKG PROCEDURE TO PT AND ATTEMPTED TO COMPLETE EKG PT BECAME ANGRY STATING HE DID 
NOT WANT EKG DONE. INDICATIONS EXPLAINED TO PT AND PT STILL REFUSED.
GAVE REPORT TO ALBAN AT [facility] IN [place], PATIENT  TIME IS 
9760.
I SPOKE TO ULTRASOUND TECH AND I INFORMED HER THAT ULTRASOUND ARTERIAL AND VENOUS BOTH LOWER 
EXTREMITIES IS PENDING.US TECH SAID SHE CAME EARLIER AND COULDN'T DO THE ULTRASOUND BECAUSE 
PATIENT WAS ON FETAL POSITION AND PATIENT WOULDN'T COOPERATIVE SO SHE SAID SHE WILL ENDORSE 
THE NEXT ULTRASOUND TECH TO DO IT TOMORROW MORNING.
INFORMED THE PATIENT THAT AN ECHO WAS ORDERED TO CHECK HIS HEART. TRIED TO GET CONSENT FOR 
5-10 MIN HOWEVER HE KEPT REPEATING "NOTHING IS WRONG WITH ME, EVERYTHING IS FINE" NOTIFIED 
DR. CARDONA ABOUT THE OUTCOME.
MD LAM CALLED BACK AND I INFORMED HIM THAT PATIENT POTASSIUM TODAY WAS HIGH 5.8 AND I 
WANTED TO KNOW IF HE WAS AWARE MD LAM SAID I WILL TAKE CARE OF IT. I ALSO INFORMED MD 
THAT PATIENT IS INCONTINENT AND STARTING TO HAVE SOME PERINEAL REDNESS AND ASKED MD IF HE 
WOULD LIKE TO ORDER A SKIN PROTECTANT BARRIER. MD SAID I WILL TAKE CARE OF IT.I INFORMED MD 
THAT PATIENT HAD THE XRAY OF ABDOMEN TONIGHT AND THE RESULTS WON'T BE IN UNTIL TOMORROW I 
ASKED MD IF PATIENT SHOULD STILL BE KEPT NPO MD LAM SAID HE WILL PLACE ORDERS.
MD UNABLE TO PLACE G TUBE DUE TO STOMA BEING CLOSED. WILL ADMIT FOR FURTHER 
CARE.
PATIENT AWAKENED TO BE CLEANED UP PATIENT SOILED AND HAS POOPED PATIENT WAS CLEANED WITH JENNYFER REDDY AND MYSELF.PATIENT CONTINUES TO YELL ATTEMPTS TO KICK PATIENT WAS ABLE TO BE CLEANED 
TURNED AND REPOSITIONED.GTUBE FEEDING CONTINUES TO INFUSE WELL.NO RESIDUAL NOTED AT THIS 
TIME.PATIENT WAS ALSO PULLED UP IN BED AND ACCOMODATED. PATIENT WAS THEN GIVEN A WARM 
BLANKET.PATIENT NEEDS CONTINUE TO BE MET.PATIENT IS CLEAN AND COMFORTABLE.
PATIENT CURRENTLY SLEEPING. ACCUCHECK DONE AT THIS TIME. CONTINUES TO BE MONITORED.
PATIENT HAD XR OF ABDOMEN WITH CONTRAST AT BEDSIDE WITH PORTABLE X RAY MACHINE.
PATIENT HAS BEEN SCREENED AND CATEGORIZED AS HIGH NUTRITION RISK. PATIENT WILL BE SEEN 
WITHIN 1-2 DAYS OF ADMISSION.



07/16/17-07/17/17



OSVALDO PEREZ MS, RDN
PATIENT IS CONFUSED AWAKE AND VERY RESTLESS IN BED,IVF INFUSING WELL AT THIS TIME IV SITE 
PATENT.NO COMPLAINS OF PAIN.PATIENT CONTINUES TO HAVE BILATERAL SOFT WRIST RESTRAINTS.FALL 
AND SAFETY PRECAUTIONS IMPLEMENTED.PATIENT CLOSE TO THE NURSES STATION AND HAS ALSO THE BED 
ALARM.WILL CONTINUE TO MONITOR.
PATIENT IS CURRENTLY RESTING IN BED SLEEPING WITH PERIODS OF RESTLESSNESS.CONTINUES TO BE 
MONITORED AND REPOSITIONED.WILL CONTINUE TO MONITOR.
PATIENT IS CURRENTLY SLEEPING AT THIS TIME WITH PERIODS OF RESTLESSNESS.IVF INFUSING WELL 
WILL CONTINUE TO MONITOR.
PATIENT IS CURRENTLY SLEEPING IN BED AT THIS TIME. ULTRASOUND TECH WAS HERE EARLIER AND DID 
A ULTRASOUND TO BOTH LOWER EXTREMITIES.PATIENT WAS KICKING AT TIMES FEET AT TIMES HIS LEGS 
HAD TO BE HELD.PATIENT KEPT SITTING UP DURING THE ULTRASOUND AND WE KEPT TELLING THE PATIENT 
TO LAY DOWN AND RELAX.AND PATIENT WAS ATTEMPTING TO GRAB THE ULTRASOUND DEVICE.FINALLY THE 
ULTRASOUND WAS DONE.
PATIENT IS CURRENTLY SLEEPING IN BED IN NO DISTRESS WILL CONTINUE TO MONITOR.
PATIENT IS CURRENTLY SLEEPING WITH PERIODS OF RESTLESSNESS.WILL CONTINUE TO MONITOR.
PATIENT IS CURRENTLY STABLE REPORT ENDORSED AT BEDSIDE TO HELEN PENA SHE WILL RESUME CARE.
PATIENT RECEIVED CURRENTLY SLEEPING GTUBE FEEDING CURRENTLY INFUSING WELL,PT TOLERATING 
FEEDING WELL NO SIGNS OR SYMPTOMS OF N/V AT THIS TIME.PATIENT CONTINUES TO BE ON BILATERAL 
SOFT WRIST RESTRAINTS.BED ALARM ON WILL CONTINUE TO MONITOR.FREQUENT VISUAL CHECKS DONE.
PATIENT RESTING IN BED HAS PERIODS OF COMBATIVENESS AND ATTEMPTS TO KICK STAFF.PATIENT WAS 
CLEANED EARLIER WITH THE ASSISTANCE OF JALEEL CARTER, AND JALEEL BUSTOS, AND MYSELF.PATIENT CONTINUES 
TO POOP.PATIENT CURRENTLY STABLE WILL CONTINUE TO MONITOR.CALL LIGHT WITHIN REACH.
PATIENT RESTLESS IN BED AND SLEEPS ON AND OFF.WILL CONTINUE TO MONITOR.
PATIENT SLEEPING IN BED WILL CONTINUE TO MONITOR.
PATIENT STABLE REPORT ENDORSED TO HELEN PENA AT BEDSIDE SHE WILL RESUME CARE OF THE PATIENT. 
RESIDENT MADE ROUNDS THIS MORNING AND WAS INFORMED OF PATIENT'S STATUS AND BEHAVIOUR DURING 
THE NIGHT.
PATIENT STABLE RESTING IN BED I WAS UNABLE TO COLLECT URINALYSIS THROUGHT THE NIGHT PATIENT 
WAS INCONTINENT AND HAD STOOL MIX WITH THE URINE AND PATIENT THIS MORNING IS CALM AND 
COOPERATIVE BUT DRY AT THIS TIME.
PATIENT WAS CLEANED WITH THE ASSISTANCE OF JENNYFER CARTER AND [facility] PATIENT WAS TURNED AND 
REPOSITIONED GOOD PERICARE GIVEN, PERINEAL AND SCROTAL AREA WITH REDNESS PATIENT HAS AN 
ORDER FOR DESITIN BUT THERE IS NONE AVAILABLE TONIGHT.PATIENT CLEANED AND 
REPOSITIONED.PATIENT CONTINUES TO YELL,KICK,CURSE,AND ATTEMPTS TO SCRATCH STAFF.PATIENT 
NEEDS CONTINUE TO BE MET PATIENT PLACED ON FALL PRECAUTIONS.WILL CONTINUE TO MONITOR.
PT AWAKE AND ALERT, NO SIGNS OF ACUTE DISTRESS.  BOWEL SOUNDS ACTIVE IN ALL 4 QUADRANTS.  
BOWEL AND BLADDER INCONTINENCE.  BEDREST.  IV PATENT AND ASYMPTOMATIC.  PATIENT IN SOFT 
BILATERAL UPPER EXTREMITY RESTRAINTS.  PT DENIES PAIN AT THIS TIME HOWEVER IS AGITATED.  
ORIENTED PATIENT TO UNIT AND TO HOSPITAL, PT VERBALIZED UNDERSTANDING HOWEVER IS CONFUSED.  
BED IN LOW POSITION WITH BILATERAL HALF SIDE RAILS UP AND CALL LIGHT WITHIN REACH.
PT AWAKE AND ALERT, NO SIGNS OF ACUTE DISTRESS.  BOWEL SOUNDS ACTIVE IN ALL 4 QUADRANTS.  
SKIN INTACT.  BOWEL AND BLADDER INCONTINENCE.  IV PATENT AND ASYMPTOMATIC.  BEDBOUND.  
RE-ORIENTED PATIENT TO UNIT AND TO HOSPITAL, PT UNABLE TO COMPREHEND, NEEDS REINFORCEMENT.  
PATIENT DENIES PAIN AT THIS TIME, FLACC SCORE = 0.  BED IN LOW POSITION WITH BILATERAL HALF 
SIDE RAILS UP, CALL LIGHT WITHIN REACH.
PT AWAKE AND ALERT, NO SIGNS OF ACUTE DISTRESS.  EDUCATED PATIENT REGARDING TRANSPORT AND 
DISCHARGE TO [facility] ON SIGNS AND SYMPTOMS OF WORSENING CONDITION 
AND INFECTION, FOLLOW UP WITH DR SAAVEDRA AT [place] AND CURRENT MEDICATIONS.  PT UNABLE TO 
COMPREHEND OR SIGN HIS DISCHARGE PAPERS.  CUT OFF WRIST BANDS, D/C'D IV AND TELE MONITOR.  
PT LEFT VIA [facility] TO [facility].  SON/CONSERVATOR MADE AWARE.
PT AWAKE, ALERT AND CONFUSED, NO SIGNS OF ACUTE DISTRESS.  ENDORSED TO NIGHT SHIFT NURSE FOR 
CONTINUITY OF CARE.
PT AWAKE, ALERT, AND CONFUSED.  NO SIGNS OF ACUTE DISTRESS.  ON ROOM AIR.  BOWEL SOUNDS 
ACTIVE IN ALL 4 QUADRANTS.  BOWEL AND BLADDER INCONTINENCE.  SKIN INTACT.  GASTROSTOMY TUBE 
IN PLACE.  PATIENT DENIES PAIN AT THIS TIME, IS CONFUSED, FLACC SCORE IS 0.  RE-ORIENTED 
PATIENT TO HOSPITAL AND TO UNIT, PT UNABLE TO COMPREHEND.  BED IN LOW POSITION WITH 
BILATERAL HALF SIDE RAILS UP, CALL LIGHT WITHIN REACH.
PT EXTREMELY AGITATED, KICKING AND YELLING.  GAVE HALDOL IM AS ORDERED PRN.  WILL CONTINUE 
TO MONITOR.
Patient BIBA BLS from SNF, triaged by RN. Waiting for an available bed.
Patient transferred to bed 4 for further care. RN evaluating patient at 
bedside.
Patient will be admitted to care of DR FISHER. Admited toTELE.  Will go to 
[id number]. Belongings list completed.  Report to HELEN MIRELES.
Patient's Plan of Care was discussed and reviewed with LVN: DANIEL IGLESIAS
Patient's Plan of Care was discussed and reviewed with LVN:DANIEL IGLESIAS
RECEIVED NEW ORDERS FROM DR SHUKLA, NOTED, WILL CARRY OUT.
SPOKE WITH SON, SARAH BYRDMUNDS AND INFORMED HIM OF PATIENT TRANSPORT TO [facility].  SON WILL FOLLOW UP WITH [place].
SS NOTE:



PER MOISE FROM [facility] ([phone]), PT CAN GO TO ROOM 130A ANYTIME AFTER 1500 
UNDER DR. JOSE GUADALUPE SAAVEDRA. KIRILL MCCULLOUGH AND DR. SHUKLA MADE AWARE.
STARTED PATIENT G -TUBE FEEDING, FIBERSOURCE 60ML PER HOUR, 75ML WATER FLUSH Q 4 HRS.  WILL 
MONITOR.
Transportation arranged with [facility].  [facility]30[facility]
Strong peripheral pulses